# Patient Record
Sex: FEMALE | Race: WHITE | NOT HISPANIC OR LATINO | Employment: PART TIME | URBAN - METROPOLITAN AREA
[De-identification: names, ages, dates, MRNs, and addresses within clinical notes are randomized per-mention and may not be internally consistent; named-entity substitution may affect disease eponyms.]

---

## 2023-02-22 LAB
ESTRADIOL (PG/ML) IN SER/PLAS: 26 PG/ML
FOLLITROPIN (IU/L) IN SER/PLAS: 91.8 IU/L

## 2023-03-31 ENCOUNTER — TELEPHONE (OUTPATIENT)
Dept: PRIMARY CARE | Facility: CLINIC | Age: 53
End: 2023-03-31
Payer: COMMERCIAL

## 2023-04-06 ENCOUNTER — TELEMEDICINE (OUTPATIENT)
Dept: PRIMARY CARE | Facility: CLINIC | Age: 53
End: 2023-04-06
Payer: COMMERCIAL

## 2023-04-06 DIAGNOSIS — G56.03 CARPAL TUNNEL SYNDROME ON BOTH SIDES: Primary | ICD-10-CM

## 2023-04-06 DIAGNOSIS — Z00.00 ROUTINE GENERAL MEDICAL EXAMINATION AT A HEALTH CARE FACILITY: ICD-10-CM

## 2023-04-06 DIAGNOSIS — R79.89 LOW VITAMIN D LEVEL: ICD-10-CM

## 2023-04-06 DIAGNOSIS — B07.0 PLANTAR WART: ICD-10-CM

## 2023-04-06 PROCEDURE — 99214 OFFICE O/P EST MOD 30 MIN: CPT | Performed by: FAMILY MEDICINE

## 2023-04-06 NOTE — PATIENT INSTRUCTIONS
Please try the Compound W Freeze Off    Please follow up in 3 months     Get me the paperwork for Marlena's guardianship     Get updated fasting blood work just prior to your next visit.

## 2023-04-06 NOTE — PROGRESS NOTES
"Subjective   Duran Cho is a 53 y.o. female who presents for No chief complaint on file..    HPI  Duran is a pleasant 53 yr old female here for follow up on anxiety  Is working on getting guardianship of her disabled daughter  Bilateral knee replacement, 2019 (jan and Nov)  had IUD- this winter is 5 years--- will need referral for removal- next year with PA will have it removed.   2019 wt was 235--> 155 down 80 pounds, with starting medical THC and knee replacement   Off all RX meds     #) Stress, brief reaction - better   - a close friend, Tomi ( son of a good friend was raised with her children) suicide in april -2022, was only 21 yrs old   - was her daughters good friend and mom is very closed to his mother   - had a lot of death and grief in her life, just not this close to home   - taking her for a loop  - division of this world with political climate is also making things worse  - mother is very much a \"Trumper\" and pt is not, so lots of conflict in her family  - on effexor , not been on anything else, been on this for 20 years.   - cant sleep anymore, very anxious   - fatigue and exhausted, reading and journaling   - on sunday \"fell off a sanam\" - reached out for help    #) left thumb concerns- stable   - left ventral radial wrist ganglion cyst   - swells and goes numb     #) generalized body aches and joint pain- stable . much better with 100 pound weight loss   - she has medical THC card and it really helps   - neck , shoulder and thoracic spine tension   - bad reaction to GABAPENTIN AND LYRICA   - had both knees replaced in 2019   - left foot surgery for tibial tendon    #) Wayne with GERD - stable  - stopped her PPI   - did get some teeth removed and getting partial soon   - EGD and Cscope on 5/19/21- repeat in 5 years   - pt reports improved with THC edibles     #) tobacco use  - 1/2 ppd     #) Preventive:  Etoh: occasional   BMI: 37  BP: 119/82  Fam h/o:  Vaccinations:  PAP: needs  Mammogram: " needs, order given   Colonoscopy: n/a til 50  DEXA: n/a  ASA 81: no  Low Dose CT: n/a til 56  HepC screen: n/a  HIV screen:  Difficulty staying asleep and sleeps 5 hours at a time  Fasting blood work: now     ROS was completed and all systems are negative with the exception of what was noted in the the HPI.     Objective     There were no vitals taken for this visit.     Physical Exam  Deferred due to virtual     Assessment/Plan   Problem List Items Addressed This Visit    None  Great job with the weight loss     continue to get the partials for the teeth and chewing     Glad you were successful with coming off the effexor and lexapro     Please follow up with Dr Mazariegos about the Mirena/ IUD, or a new referral was placed     for the night sweats consider black cohosh with tumeric.     Glad to see your EGD and Colonoscopy was good in 5/19/2021. Repeat in 2026    Labs in July 2022 were good.     for the neck and back pain, get a foam roller     Mammogram on 12/29/21, you are due for repeat in December 2022.     Your blood pressure looks good.     150 mins of aerobic exercise is advised for good cardiovascular health and maintain a healthy weight.     Please try to work on maintaining a healthy body weight, with a BMI close to or at a BMI 19-25. Your BMI is 26.    Recommend a whole-foods, plant-based diet, filled with fresh fruits, vegetables, seeds, beans, nuts and berries.    OF course, do not text and drive and always wear a seat belt.     Please follow up in 3 months or sooner as needed.        Cydney Toure DO, MSMed, ABOM  [address]   [id number]   [place] [address]  Ph. [phone]  Fx. [phone]

## 2023-06-13 DIAGNOSIS — R59.0 LYMPHADENOPATHY, POSTERIOR CERVICAL: Primary | ICD-10-CM

## 2023-06-13 NOTE — PROGRESS NOTES
Weight loss  Night sweats  Left posterior LAD   - did tweak the neck a few times in the last couple of weeks  No ear infection, no sinus infection   Did color her hair, no no scalp trauma or rashes   Is a smoker   Will order an US of the neck and refer to ENT

## 2023-06-22 ENCOUNTER — LAB (OUTPATIENT)
Dept: LAB | Facility: LAB | Age: 53
End: 2023-06-22
Payer: COMMERCIAL

## 2023-06-22 DIAGNOSIS — Z00.00 ROUTINE GENERAL MEDICAL EXAMINATION AT A HEALTH CARE FACILITY: ICD-10-CM

## 2023-06-22 DIAGNOSIS — R79.89 LOW VITAMIN D LEVEL: ICD-10-CM

## 2023-06-22 LAB
ALANINE AMINOTRANSFERASE (SGPT) (U/L) IN SER/PLAS: 13 U/L (ref 7–45)
ALBUMIN (G/DL) IN SER/PLAS: 4 G/DL (ref 3.4–5)
ALKALINE PHOSPHATASE (U/L) IN SER/PLAS: 79 U/L (ref 33–110)
ANION GAP IN SER/PLAS: 13 MMOL/L (ref 10–20)
ASPARTATE AMINOTRANSFERASE (SGOT) (U/L) IN SER/PLAS: 16 U/L (ref 9–39)
BASOPHILS (10*3/UL) IN BLOOD BY AUTOMATED COUNT: 0.06 X10E9/L (ref 0–0.1)
BASOPHILS/100 LEUKOCYTES IN BLOOD BY AUTOMATED COUNT: 0.6 % (ref 0–2)
BILIRUBIN TOTAL (MG/DL) IN SER/PLAS: 0.4 MG/DL (ref 0–1.2)
CALCIDIOL (25 OH VITAMIN D3) (NG/ML) IN SER/PLAS: 83 NG/ML
CALCIUM (MG/DL) IN SER/PLAS: 9.5 MG/DL (ref 8.6–10.3)
CARBON DIOXIDE, TOTAL (MMOL/L) IN SER/PLAS: 26 MMOL/L (ref 21–32)
CHLORIDE (MMOL/L) IN SER/PLAS: 106 MMOL/L (ref 98–107)
CHOLESTEROL (MG/DL) IN SER/PLAS: 136 MG/DL (ref 0–199)
CHOLESTEROL IN HDL (MG/DL) IN SER/PLAS: 47.6 MG/DL
CHOLESTEROL/HDL RATIO: 2.9
CREATININE (MG/DL) IN SER/PLAS: 0.67 MG/DL (ref 0.5–1.05)
EOSINOPHILS (10*3/UL) IN BLOOD BY AUTOMATED COUNT: 0.24 X10E9/L (ref 0–0.7)
EOSINOPHILS/100 LEUKOCYTES IN BLOOD BY AUTOMATED COUNT: 2.2 % (ref 0–6)
ERYTHROCYTE DISTRIBUTION WIDTH (RATIO) BY AUTOMATED COUNT: 13 % (ref 11.5–14.5)
ERYTHROCYTE MEAN CORPUSCULAR HEMOGLOBIN CONCENTRATION (G/DL) BY AUTOMATED: 32.9 G/DL (ref 32–36)
ERYTHROCYTE MEAN CORPUSCULAR VOLUME (FL) BY AUTOMATED COUNT: 94 FL (ref 80–100)
ERYTHROCYTES (10*6/UL) IN BLOOD BY AUTOMATED COUNT: 4.48 X10E12/L (ref 4–5.2)
GFR FEMALE: >90 ML/MIN/1.73M2
GLUCOSE (MG/DL) IN SER/PLAS: 73 MG/DL (ref 74–99)
HEMATOCRIT (%) IN BLOOD BY AUTOMATED COUNT: 42.3 % (ref 36–46)
HEMOGLOBIN (G/DL) IN BLOOD: 13.9 G/DL (ref 12–16)
IMMATURE GRANULOCYTES/100 LEUKOCYTES IN BLOOD BY AUTOMATED COUNT: 0.3 % (ref 0–0.9)
LDL: 78 MG/DL (ref 0–99)
LEUKOCYTES (10*3/UL) IN BLOOD BY AUTOMATED COUNT: 10.7 X10E9/L (ref 4.4–11.3)
LYMPHOCYTES (10*3/UL) IN BLOOD BY AUTOMATED COUNT: 3.38 X10E9/L (ref 1.2–4.8)
LYMPHOCYTES/100 LEUKOCYTES IN BLOOD BY AUTOMATED COUNT: 31.6 % (ref 13–44)
MONOCYTES (10*3/UL) IN BLOOD BY AUTOMATED COUNT: 0.99 X10E9/L (ref 0.1–1)
MONOCYTES/100 LEUKOCYTES IN BLOOD BY AUTOMATED COUNT: 9.3 % (ref 2–10)
NEUTROPHILS (10*3/UL) IN BLOOD BY AUTOMATED COUNT: 5.98 X10E9/L (ref 1.2–7.7)
NEUTROPHILS/100 LEUKOCYTES IN BLOOD BY AUTOMATED COUNT: 56 % (ref 40–80)
PLATELETS (10*3/UL) IN BLOOD AUTOMATED COUNT: 223 X10E9/L (ref 150–450)
POTASSIUM (MMOL/L) IN SER/PLAS: 4 MMOL/L (ref 3.5–5.3)
PROTEIN TOTAL: 6.1 G/DL (ref 6.4–8.2)
SODIUM (MMOL/L) IN SER/PLAS: 141 MMOL/L (ref 136–145)
THYROTROPIN (MIU/L) IN SER/PLAS BY DETECTION LIMIT <= 0.05 MIU/L: 3.92 MIU/L (ref 0.44–3.98)
TRIGLYCERIDE (MG/DL) IN SER/PLAS: 54 MG/DL (ref 0–149)
UREA NITROGEN (MG/DL) IN SER/PLAS: 11 MG/DL (ref 6–23)
VLDL: 11 MG/DL (ref 0–40)

## 2023-06-22 PROCEDURE — 36415 COLL VENOUS BLD VENIPUNCTURE: CPT

## 2023-06-22 PROCEDURE — 85025 COMPLETE CBC W/AUTO DIFF WBC: CPT

## 2023-06-22 PROCEDURE — 84443 ASSAY THYROID STIM HORMONE: CPT

## 2023-06-22 PROCEDURE — 80061 LIPID PANEL: CPT

## 2023-06-22 PROCEDURE — 80053 COMPREHEN METABOLIC PANEL: CPT

## 2023-06-22 PROCEDURE — 82306 VITAMIN D 25 HYDROXY: CPT

## 2023-06-26 ENCOUNTER — LAB (OUTPATIENT)
Dept: LAB | Facility: LAB | Age: 53
End: 2023-06-26
Payer: COMMERCIAL

## 2023-06-26 ENCOUNTER — OFFICE VISIT (OUTPATIENT)
Dept: PRIMARY CARE | Facility: CLINIC | Age: 53
End: 2023-06-26
Payer: COMMERCIAL

## 2023-06-26 VITALS
WEIGHT: 148 LBS | HEART RATE: 91 BPM | BODY MASS INDEX: 23.89 KG/M2 | SYSTOLIC BLOOD PRESSURE: 132 MMHG | OXYGEN SATURATION: 98 % | DIASTOLIC BLOOD PRESSURE: 74 MMHG

## 2023-06-26 DIAGNOSIS — R63.4 UNEXPLAINED WEIGHT LOSS: ICD-10-CM

## 2023-06-26 DIAGNOSIS — E16.2 HYPOGLYCEMIA: ICD-10-CM

## 2023-06-26 DIAGNOSIS — F17.200 SMOKER: ICD-10-CM

## 2023-06-26 DIAGNOSIS — Z00.00 PREVENTATIVE HEALTH CARE: ICD-10-CM

## 2023-06-26 DIAGNOSIS — R63.4 UNEXPLAINED WEIGHT LOSS: Primary | ICD-10-CM

## 2023-06-26 LAB
C REACTIVE PROTEIN (MG/L) IN SER/PLAS: 0.17 MG/DL
SEDIMENTATION RATE, ERYTHROCYTE: 10 MM/H (ref 0–30)

## 2023-06-26 PROCEDURE — 99396 PREV VISIT EST AGE 40-64: CPT | Performed by: FAMILY MEDICINE

## 2023-06-26 PROCEDURE — 87389 HIV-1 AG W/HIV-1&-2 AB AG IA: CPT

## 2023-06-26 PROCEDURE — 83525 ASSAY OF INSULIN: CPT

## 2023-06-26 PROCEDURE — 84305 ASSAY OF SOMATOMEDIN: CPT

## 2023-06-26 PROCEDURE — 83970 ASSAY OF PARATHORMONE: CPT

## 2023-06-26 PROCEDURE — 36415 COLL VENOUS BLD VENIPUNCTURE: CPT

## 2023-06-26 PROCEDURE — 86140 C-REACTIVE PROTEIN: CPT

## 2023-06-26 PROCEDURE — 85652 RBC SED RATE AUTOMATED: CPT

## 2023-06-26 PROCEDURE — 99214 OFFICE O/P EST MOD 30 MIN: CPT | Performed by: FAMILY MEDICINE

## 2023-06-26 PROCEDURE — 86803 HEPATITIS C AB TEST: CPT

## 2023-06-26 PROCEDURE — 81003 URINALYSIS AUTO W/O SCOPE: CPT

## 2023-06-26 PROCEDURE — 1036F TOBACCO NON-USER: CPT | Performed by: FAMILY MEDICINE

## 2023-06-26 ASSESSMENT — PATIENT HEALTH QUESTIONNAIRE - PHQ9
1. LITTLE INTEREST OR PLEASURE IN DOING THINGS: SEVERAL DAYS
2. FEELING DOWN, DEPRESSED OR HOPELESS: SEVERAL DAYS
SUM OF ALL RESPONSES TO PHQ9 QUESTIONS 1 AND 2: 2

## 2023-06-26 NOTE — PATIENT INSTRUCTIONS
Normal thyroid levels.   Normal electrolytes, other than the low sugar.   Also no explanation of the low protein levels.   continue to get the partials for the teeth and chewing     Lets try to get CT of the abdomen and pelvis.   Will get a chest xray first.     Please follow up with Dr Mazariegos about the Mirena/ IUD removal.    Glad to see your EGD and Colonoscopy was good in 5/19/2021. Repeat in 2026    Mammogram on 12/29/21, you are due for repeat in December 2022.     Your blood pressure looks good.     Please follow up in 3 months

## 2023-06-26 NOTE — PROGRESS NOTES
"Subjective   Patient is a 53 y.o. female presents for yearly physical examination.     Is working on getting guardianship of her disabled daughter    Bilateral knee replacement, 2019 (jan and Nov)    had IUD- this winter is 5 years--- will need referral for removal- next year with PA will have it removed.     2019 wt was 235--> 155 down 80 pounds--> 148 , with starting medical THC and knee replacement     #) left suboccipital LAD  - had US and looked reactive on US   - do appear slightly smaller     #) feels like she needs to eat a lot to keep her energy levels up   - drinking 3-4 Ensure per day, 2 large meals, lexi bars and Gatorade/Tea/Coffee with Oatmilk, and snacking  - not a lot of options right now due to low funds     #) is getting frequent hypoglycemia   - still getting night sweats and hot flashes   - feeling weak and fatigued  - can't keep weight above 150  - CT brain looked good in 2016.   - is a smoker, would like to image the lungs   - EGD and Cscope on 5/19/21- repeat in 5 years     #) Stress, brief reaction - better   - mother is very much a \"Trumper\" and pt is not, so lots of conflict in her family  - on effexor , not been on anything else, been on this for 20 years.   - fatigue and exhausted, reading and journaling   - on sunday \"fell off a sanam\" - reached out for help    #) left thumb concerns- stable   - left ventral radial wrist ganglion cyst   - swells and goes numb     #) generalized body aches and joint pain- stable . much better with 100 pound weight loss , but still having it   - she has medical THC card and it really helps   - neck , shoulder and thoracic spine tension   - bad reaction to GABAPENTIN AND LYRICA   - had both knees replaced in 2019   - left foot surgery for tibial tendon    #) Wayne with GERD - stable  - stopped her PPI   - did get some teeth removed and getting partial soon   - EGD and Cscope on 5/19/21- repeat in 5 years   - pt reports improved with THC edibles     #) " tobacco use- for years.   - 1/2 ppd     #) Preventive:  Etoh: occasional   BMI: 37  BP: 119/82  Fam h/o:  Vaccinations:  PAP: needs  Mammogram: needs, order given   Colonoscopy: n/a til 50  DEXA: n/a  ASA 81: no  Low Dose CT: n/a til 55  HepC screen: n/a  HIV screen:  Difficulty staying asleep and sleeps 5 hours at a time  Fasting blood work: now     Review of system are negative unless otherwise stated in the HPI.     Objective     /74   Pulse 91   Wt 67.1 kg (148 lb)   SpO2 98%   BMI 23.89 kg/m²     Physical Examination  GEN: A+O, no acute distress  HEENT: NC/AT, Oropharynx clear, no exudates, TM visualized, Extraoccular muscles intact, no facial droop; no thyromegaly or cervical LAD  RESP: CTAB, no wheezes   CV: RRR, no murmurs  ABD: soft, non-tender, + BS  SKIN: no rashes or bruising, no peripheral edema   NEURO: CN II-XII grossly intact, moves all extremities equally, no tremor   PSYCH: normal affect, appropriate mood     Assessment/Plan     We reviewed the most resent labs  Normal thyroid levels.   Normal electrolytes, other than the low sugar.   Also no explanation of the low protein levels.   continue to get the partials for the teeth and chewing     Lets try to get CT of the abdomen and pelvis.   Will get a chest xray first.     Please follow up with Dr Mazariegos about the Mirena/ IUD removal.    Glad to see your EGD and Colonoscopy was good in 5/19/2021. Repeat in 2026    Mammogram on 12/29/21, you are due for repeat in December 2022.     Your blood pressure looks good.     Please follow up in 3 months

## 2023-06-27 LAB
APPEARANCE, URINE: CLEAR
BILIRUBIN, URINE: NEGATIVE
BLOOD, URINE: NEGATIVE
COLOR, URINE: YELLOW
GLUCOSE, URINE: NEGATIVE MG/DL
HEPATITIS C VIRUS AB PRESENCE IN SERUM: NONREACTIVE
HIV 1/ 2 AG/AB SCREEN: NONREACTIVE
INSULIN: 35 UIU/ML (ref 3–25)
KETONES, URINE: NEGATIVE MG/DL
LEUKOCYTE ESTERASE, URINE: NEGATIVE
NITRITE, URINE: NEGATIVE
PARATHYRIN INTACT (PG/ML) IN SER/PLAS: 41.6 PG/ML (ref 18.5–88)
PH, URINE: 7 (ref 5–8)
PROTEIN, URINE: NEGATIVE MG/DL
SPECIFIC GRAVITY, URINE: 1 (ref 1–1.03)
UROBILINOGEN, URINE: <2 MG/DL (ref 0–1.9)

## 2023-06-30 LAB
IGF 1 (INSULIN-LIKE GROWTH FACTOR 1): 227 NG/ML (ref 53–234)
IGF 1 Z SCORE CALCULATION: 1.7

## 2023-07-16 DIAGNOSIS — E27.8 ADRENAL HYPERPLASIA (MULTI): Primary | ICD-10-CM

## 2023-07-16 DIAGNOSIS — E16.1 HYPERINSULINEMIA: ICD-10-CM

## 2023-07-17 ENCOUNTER — LAB (OUTPATIENT)
Dept: LAB | Facility: LAB | Age: 53
End: 2023-07-17
Payer: COMMERCIAL

## 2023-07-17 DIAGNOSIS — E16.1 HYPERINSULINEMIA: ICD-10-CM

## 2023-07-17 DIAGNOSIS — E27.8 ADRENAL HYPERPLASIA (MULTI): ICD-10-CM

## 2023-07-17 PROCEDURE — 84146 ASSAY OF PROLACTIN: CPT

## 2023-07-17 PROCEDURE — 82533 TOTAL CORTISOL: CPT

## 2023-07-17 PROCEDURE — 36415 COLL VENOUS BLD VENIPUNCTURE: CPT

## 2023-07-17 PROCEDURE — 83036 HEMOGLOBIN GLYCOSYLATED A1C: CPT

## 2023-07-17 PROCEDURE — 84681 ASSAY OF C-PEPTIDE: CPT

## 2023-07-17 PROCEDURE — 82024 ASSAY OF ACTH: CPT

## 2023-07-18 LAB
C PEPTIDE (NG/ML) IN SER/PLAS: 2 NG/ML (ref 0.7–3.9)
CORTISOL (UG/DL) IN SERUM: 24.4 UG/DL (ref 2.5–20)
ESTIMATED AVERAGE GLUCOSE FOR HBA1C: 94 MG/DL
HEMOGLOBIN A1C/HEMOGLOBIN TOTAL IN BLOOD: 4.9 %
PROLACTIN (UG/L) IN SER/PLAS: 12.2 UG/L (ref 3–20)

## 2023-07-20 LAB — ADRENOCORTICOTROPIC HORMONE: 34.3 PG/ML (ref 7.2–63.3)

## 2023-08-11 ENCOUNTER — PATIENT MESSAGE (OUTPATIENT)
Dept: PRIMARY CARE | Facility: CLINIC | Age: 53
End: 2023-08-11
Payer: COMMERCIAL

## 2023-08-11 DIAGNOSIS — R63.4 UNEXPLAINED WEIGHT LOSS: Primary | ICD-10-CM

## 2023-08-17 ENCOUNTER — OFFICE VISIT (OUTPATIENT)
Dept: PRIMARY CARE | Facility: CLINIC | Age: 53
End: 2023-08-17
Payer: COMMERCIAL

## 2023-08-17 VITALS
DIASTOLIC BLOOD PRESSURE: 72 MMHG | OXYGEN SATURATION: 100 % | BODY MASS INDEX: 24.53 KG/M2 | WEIGHT: 152 LBS | HEART RATE: 75 BPM | SYSTOLIC BLOOD PRESSURE: 116 MMHG

## 2023-08-17 DIAGNOSIS — F51.02 ADJUSTMENT INSOMNIA: ICD-10-CM

## 2023-08-17 DIAGNOSIS — F43.21 GRIEF REACTION: Primary | ICD-10-CM

## 2023-08-17 PROBLEM — E55.9 VITAMIN D DEFICIENCY: Status: ACTIVE | Noted: 2023-08-17

## 2023-08-17 PROBLEM — J30.9 ALLERGIC RHINITIS: Status: ACTIVE | Noted: 2023-08-17

## 2023-08-17 PROBLEM — M25.50 POLYARTHRALGIA: Status: ACTIVE | Noted: 2023-08-17

## 2023-08-17 PROBLEM — K22.70 BARRETT'S ESOPHAGUS WITHOUT DYSPLASIA: Status: ACTIVE | Noted: 2017-02-07

## 2023-08-17 PROBLEM — F41.8 DEPRESSION WITH ANXIETY: Status: ACTIVE | Noted: 2017-01-30

## 2023-08-17 PROBLEM — Z96.652 STATUS POST TOTAL LEFT KNEE REPLACEMENT: Status: ACTIVE | Noted: 2023-08-17

## 2023-08-17 PROBLEM — K90.9 ABNORMAL INTESTINAL ABSORPTION (HHS-HCC): Status: ACTIVE | Noted: 2023-08-17

## 2023-08-17 PROBLEM — D64.9 ABSOLUTE ANEMIA: Status: ACTIVE | Noted: 2023-08-17

## 2023-08-17 PROBLEM — E16.1 REACTIVE HYPOGLYCEMIA: Status: ACTIVE | Noted: 2023-08-17

## 2023-08-17 PROBLEM — K21.9 GERD WITHOUT ESOPHAGITIS: Status: ACTIVE | Noted: 2023-08-17

## 2023-08-17 PROCEDURE — 99214 OFFICE O/P EST MOD 30 MIN: CPT | Performed by: FAMILY MEDICINE

## 2023-08-17 PROCEDURE — 1036F TOBACCO NON-USER: CPT | Performed by: FAMILY MEDICINE

## 2023-08-17 RX ORDER — TRAZODONE HYDROCHLORIDE 100 MG/1
100 TABLET ORAL NIGHTLY PRN
Qty: 30 TABLET | Refills: 0 | Status: SHIPPED | OUTPATIENT
Start: 2023-08-17 | End: 2023-09-01 | Stop reason: SDUPTHER

## 2023-08-17 ASSESSMENT — PATIENT HEALTH QUESTIONNAIRE - PHQ9
1. LITTLE INTEREST OR PLEASURE IN DOING THINGS: NEARLY EVERY DAY
SUM OF ALL RESPONSES TO PHQ9 QUESTIONS 1 AND 2: 6
2. FEELING DOWN, DEPRESSED OR HOPELESS: NEARLY EVERY DAY

## 2023-08-17 NOTE — PATIENT INSTRUCTIONS
"Please look into getting the book \"Its OK that you're not OK\" by Nona Torres     Please try the trazodone 1 hour prior to bedtime for treatment of insomnia    Follow up in 1 month or sooner as needed.   "

## 2023-08-17 NOTE — PROGRESS NOTES
"Subjective   Patient is a 53 y.o. female presents for follow up on the loss of her daughter , joe    #) acute grief reaction   - not sleep fell   - she found her passed in her bed one morning and performed 20 mins or more of CPR   - tried ativan and it helped a little   - mother is very much a \"Trumper\" and pt is not, so lots of conflict in her family on politics   - on effexor , not been on anything else, been on this for 20 years. Weaned off last year   - fatigue and exhausted, reading and journaling     Bilateral knee replacement, 2019 (jan and Nov)    had IUD- this winter is 5 years--- will need referral for removal- next year with PA will have it removed.     2019 wt was 235--> 155 down 80 pounds--> 148 , with starting medical THC and knee replacement     #) left suboccipital LAD  - had US and looked reactive on US   - do appear slightly smaller     #) feels like she needs to eat a lot to keep her energy levels up   - drinking 3-4 Ensure per day, 2 large meals, lexi bars and Gatorade/Tea/Coffee with Oatmilk, and snacking  - not a lot of options right now due to low funds     #) is getting frequent hypoglycemia   - still getting night sweats and hot flashes   - feeling weak and fatigued  - can't keep weight above 150  - CT brain looked good in 2016.   - is a smoker, would like to image the lungs   - EGD and Cscope on 5/19/21- repeat in 5 years     #) left thumb concerns- stable   - left ventral radial wrist ganglion cyst   - swells and goes numb     #) generalized body aches and joint pain- stable . much better with 100 pound weight loss , but still having it   - she has medical THC card and it really helps   - neck , shoulder and thoracic spine tension   - bad reaction to GABAPENTIN AND LYRICA   - had both knees replaced in 2019   - left foot surgery for tibial tendon    #) Wayne with GERD - stable  - stopped her PPI   - did get some teeth removed and getting partial soon   - EGD and Cscope on 5/19/21- " "repeat in 5 years   - pt reports improved with THC edibles     #) tobacco use- for years.   - 1/2 ppd     #) Preventive:  Etoh: occasional   BMI: 37  BP: 119/82  Fam h/o:  Vaccinations:  PAP: needs  Mammogram: needs, order given   Colonoscopy: n/a til 50  DEXA: n/a  ASA 81: no  Low Dose CT: n/a til 55  HepC screen: n/a  HIV screen:  Difficulty staying asleep and sleeps 5 hours at a time  Fasting blood work: now     Review of system are negative unless otherwise stated in the HPI.     Objective     /72   Pulse 75   Wt 68.9 kg (152 lb)   SpO2 100%   BMI 24.53 kg/m²     Physical Examination  GEN: A+O, no acute distress  HEENT: NC/AT, Oropharynx clear, no exudates, TM visualized, Extraoccular muscles intact, no facial droop; no thyromegaly or cervical LAD  RESP: CTAB, no wheezes   CV: RRR, no murmurs  ABD: soft, non-tender, + BS  SKIN: no rashes or bruising, no peripheral edema   NEURO: CN II-XII grossly intact, moves all extremities equally, no tremor   PSYCH: normal affect, appropriate mood     Assessment/Plan     Please look into getting the book \"Its OK that you're not OK\" by Nona Torres     Please try the trazodone 1 hour prior to bedtime for treatment of insomnia    Follow up in 1 month or sooner as needed.   "

## 2023-09-01 DIAGNOSIS — F51.02 ADJUSTMENT INSOMNIA: ICD-10-CM

## 2023-09-01 RX ORDER — TRAZODONE HYDROCHLORIDE 100 MG/1
150 TABLET ORAL NIGHTLY
Qty: 135 TABLET | Refills: 3 | Status: SHIPPED | OUTPATIENT
Start: 2023-09-01 | End: 2023-12-21 | Stop reason: DRUGHIGH

## 2023-09-19 ENCOUNTER — APPOINTMENT (OUTPATIENT)
Dept: PRIMARY CARE | Facility: CLINIC | Age: 53
End: 2023-09-19
Payer: COMMERCIAL

## 2023-09-28 ENCOUNTER — OFFICE VISIT (OUTPATIENT)
Dept: PRIMARY CARE | Facility: CLINIC | Age: 53
End: 2023-09-28
Payer: COMMERCIAL

## 2023-09-28 VITALS — DIASTOLIC BLOOD PRESSURE: 74 MMHG | SYSTOLIC BLOOD PRESSURE: 112 MMHG | BODY MASS INDEX: 24.02 KG/M2 | WEIGHT: 148.8 LBS

## 2023-09-28 DIAGNOSIS — F43.21 ADJUSTMENT REACTION, DEPRESSIVE, BRIEF: ICD-10-CM

## 2023-09-28 DIAGNOSIS — M67.432 GANGLION CYST OF VOLAR ASPECT OF LEFT WRIST: Primary | ICD-10-CM

## 2023-09-28 DIAGNOSIS — J30.1 SEASONAL ALLERGIC RHINITIS DUE TO POLLEN: ICD-10-CM

## 2023-09-28 PROCEDURE — 99214 OFFICE O/P EST MOD 30 MIN: CPT | Performed by: FAMILY MEDICINE

## 2023-09-28 PROCEDURE — 1036F TOBACCO NON-USER: CPT | Performed by: FAMILY MEDICINE

## 2023-09-28 ASSESSMENT — PATIENT HEALTH QUESTIONNAIRE - PHQ9
SUM OF ALL RESPONSES TO PHQ9 QUESTIONS 1 AND 2: 2
1. LITTLE INTEREST OR PLEASURE IN DOING THINGS: SEVERAL DAYS
2. FEELING DOWN, DEPRESSED OR HOPELESS: SEVERAL DAYS

## 2023-09-28 NOTE — PATIENT INSTRUCTIONS
Please set up a consultation with Dr Tadeo for the ganglion cyst on the left wrist.     Please try the Flonase over the counter for the nasal congestion     Your blood pressure looks very good at 112/74     Drink more water, make sure the urine is clear.     Weight is stable.     Repeat mammogram in February 2024.     Please continue the trazodone 1 hour prior to bedtime for treatment of insomnia    Follow up in 3 months or sooner as needed.

## 2023-09-28 NOTE — PROGRESS NOTES
Subjective   Patient is a 53 y.o. female presents for follow up on the loss of her daughter , joe    #) acute grief reaction - coping as expected, still tearful and working through the grief   - sleeping is better with the trazodone   - she found her passed in her bed one morning and performed 20 mins or more of CPR - 5-6 weeks ago   - tried ativan and it helped a little   - on effexor , not been on anything else, been on this for 20 years. Weaned off last year   - fatigue and exhausted, reading and journaling     Bilateral knee replacement, 2019 (jan and Nov)    had IUD- this winter is 5 years--- will need referral for removal- next year with PA will have it removed.     2019 wt was 235--> 155 down 80 pounds--> 148 , with starting medical THC and knee replacement     #) left suboccipital LAD  - had US and looked reactive on US   - do appear slightly smaller     #) feels like she needs to eat a lot to keep her energy levels up   - drinking 3-4 Ensure per day, 2 large meals, lexi bars and Gatorade/Tea/Coffee with Oatmilk, and snacking  - not a lot of options right now due to low funds     #) is getting frequent hypoglycemia - better   - still getting night sweats and hot flashes   - feeling weak and fatigued  - can't keep weight above 150  - CT brain looked good in 2016.   - is a smoker, would like to image the lungs   - EGD and Cscope on 5/19/21- repeat in 5 years     #) left thumb concerns- stable   - left ventral radial wrist ganglion cyst   - swells and goes numb     #) generalized body aches and joint pain- stable . much better with 100 pound weight loss , but still having it   - she has medical THC card and it really helps   - neck , shoulder and thoracic spine tension   - bad reaction to GABAPENTIN AND LYRICA   - had both knees replaced in 2019   - left foot surgery for tibial tendon    #) Wayne with GERD - stable  - stopped her PPI   - did get some teeth removed and getting partial soon   - EGD and Cscope  on 5/19/21- repeat in 5 years   - pt reports improved with THC edibles     #) 7/22/23- walked on a rack and it smacked her in the forehead just above the right  inner eye   - immediate and bruising  - then 2 days later started getting right inner eye bruising and then spread to the inner aspect of the left eye.   - no imaging   - recent eye examination and vision is fine  - still intermittent headaches, 3 days per week, worse with sinuses are stuffy    #) tobacco use- for years.   - 1/2 ppd     #) Preventive:  Etoh: occasional   BMI: 37  BP: 119/82  Fam h/o:  Vaccinations:  PAP: needs  Mammogram: needs, order given   Colonoscopy: n/a til 50  DEXA: n/a  ASA 81: no  Low Dose CT: n/a til 55  HepC screen: n/a  HIV screen:  Difficulty staying asleep and sleeps 5 hours at a time  Fasting blood work: now     Review of system are negative unless otherwise stated in the HPI.     Objective     /74   Wt 67.5 kg (148 lb 12.8 oz)   BMI 24.02 kg/m²     Physical Examination  GEN: A+O, no acute distress  HEENT: NC/AT, Oropharynx clear, no exudates, TM visualized, Extraoccular muscles intact, no facial droop; no thyromegaly or cervical LAD  RESP: CTAB, no wheezes   CV: RRR, no murmurs  ABD: soft, non-tender, + BS  SKIN: no rashes or bruising, no peripheral edema   NEURO: CN II-XII grossly intact, moves all extremities equally, no tremor   PSYCH: normal affect, appropriate mood     Assessment/Plan     Please set up a consultation with Dr Tadeo for the ganglion cyst on the left wrist.     Please try the Flonase over the counter for the nasal congestion     Your blood pressure looks very good at 112/74     Drink more water, make sure the urine is clear.     Weight is stable.     Repeat mammogram in February 2024.     Please continue the trazodone 1 hour prior to bedtime for treatment of insomnia    Follow up in 3 months or sooner as needed.

## 2023-10-13 DIAGNOSIS — R22.32 MASS OF LEFT WRIST: ICD-10-CM

## 2023-10-13 PROBLEM — G90.9 AUTONOMIC DYSFUNCTION: Status: ACTIVE | Noted: 2023-10-13

## 2023-10-13 PROBLEM — R21 RASH AND OTHER NONSPECIFIC SKIN ERUPTION: Status: ACTIVE | Noted: 2018-01-19

## 2023-10-13 PROBLEM — G52.2 VAGAL NERVE SENSITIVITY: Status: ACTIVE | Noted: 2023-10-13

## 2023-10-13 PROBLEM — E04.2 MULTIPLE THYROID NODULES: Status: ACTIVE | Noted: 2023-10-13

## 2023-10-13 PROBLEM — N92.1 MENORRHAGIA WITH IRREGULAR CYCLE: Status: ACTIVE | Noted: 2023-10-13

## 2023-10-13 PROBLEM — E66.3 OVERWEIGHT WITH BODY MASS INDEX (BMI) OF 28 TO 28.9 IN ADULT: Status: ACTIVE | Noted: 2023-10-13

## 2023-10-13 PROBLEM — M79.673 FOOT PAIN: Status: ACTIVE | Noted: 2023-10-13

## 2023-10-13 PROBLEM — M25.561 BILATERAL KNEE PAIN: Status: ACTIVE | Noted: 2023-10-13

## 2023-10-13 PROBLEM — M25.569 JOINT PAIN, KNEE: Status: ACTIVE | Noted: 2023-10-13

## 2023-10-13 PROBLEM — D50.8 OTHER IRON DEFICIENCY ANEMIAS: Status: ACTIVE | Noted: 2023-10-13

## 2023-10-13 PROBLEM — L30.4 ERYTHEMA INTERTRIGO: Status: ACTIVE | Noted: 2018-01-19

## 2023-10-13 PROBLEM — M21.40 PES PLANUS: Status: ACTIVE | Noted: 2023-10-13

## 2023-10-13 PROBLEM — M67.40 GANGLION CYST: Status: ACTIVE | Noted: 2023-10-13

## 2023-10-13 PROBLEM — R52 DIFFUSE PAIN: Status: ACTIVE | Noted: 2023-10-13

## 2023-10-13 PROBLEM — I82.90 VENOUS THROMBOSIS: Status: ACTIVE | Noted: 2023-10-13

## 2023-10-13 PROBLEM — L30.9 DERMATITIS, UNSPECIFIED: Status: ACTIVE | Noted: 2018-01-19

## 2023-10-13 PROBLEM — M25.562 BILATERAL KNEE PAIN: Status: ACTIVE | Noted: 2023-10-13

## 2023-10-13 PROBLEM — R05.3 CHRONIC COUGH: Status: ACTIVE | Noted: 2023-10-13

## 2023-10-13 PROBLEM — E04.9 ENLARGED THYROID: Status: ACTIVE | Noted: 2023-10-13

## 2023-10-13 PROBLEM — J37.0 LARYNGITIS, CHRONIC: Status: ACTIVE | Noted: 2023-10-13

## 2023-10-13 PROBLEM — E66.9 OBESITY: Status: ACTIVE | Noted: 2023-10-13

## 2023-10-13 RX ORDER — FLUTICASONE PROPIONATE 50 MCG
1-2 SPRAY, SUSPENSION (ML) NASAL DAILY
COMMUNITY
Start: 2019-03-12 | End: 2023-12-21 | Stop reason: WASHOUT

## 2023-10-13 RX ORDER — CYCLOBENZAPRINE HCL 5 MG
1 TABLET ORAL EVERY 12 HOURS
COMMUNITY
Start: 2021-08-16 | End: 2023-12-21 | Stop reason: WASHOUT

## 2023-10-13 RX ORDER — VENLAFAXINE HYDROCHLORIDE 225 MG/1
1 TABLET, EXTENDED RELEASE ORAL DAILY
COMMUNITY
End: 2023-12-21 | Stop reason: WASHOUT

## 2023-10-13 RX ORDER — DIAZEPAM 10 MG/1
20 TABLET ORAL
COMMUNITY
Start: 2022-11-16 | End: 2023-12-21 | Stop reason: WASHOUT

## 2023-10-13 RX ORDER — HYDROCODONE BITARTRATE AND ACETAMINOPHEN 10; 325 MG/1; MG/1
1 TABLET ORAL EVERY 4 HOURS PRN
COMMUNITY
End: 2023-12-21 | Stop reason: WASHOUT

## 2023-10-13 RX ORDER — CONJUGATED ESTROGENS AND MEDROXYPROGESTERONE ACETATE 0.625 (14)
1 KIT ORAL DAILY
COMMUNITY
End: 2023-12-21 | Stop reason: WASHOUT

## 2023-10-13 RX ORDER — CHLORPHENIRAMINE MALEATE 4 MG
4 TABLET ORAL EVERY 6 HOURS PRN
COMMUNITY
End: 2023-12-21 | Stop reason: WASHOUT

## 2023-10-13 RX ORDER — BENZONATATE 200 MG/1
1 CAPSULE ORAL 2 TIMES DAILY
COMMUNITY
Start: 2021-08-16 | End: 2023-12-21 | Stop reason: WASHOUT

## 2023-10-13 RX ORDER — GUAIFENESIN AND PHENYLEPHRINE HCL 400; 10 MG/1; MG/1
1 TABLET ORAL
COMMUNITY
End: 2023-12-21 | Stop reason: WASHOUT

## 2023-10-13 RX ORDER — HYDROCODONE BITARTRATE AND ACETAMINOPHEN 5; 325 MG/1; MG/1
1 TABLET ORAL EVERY 4 HOURS PRN
COMMUNITY
Start: 2022-11-16 | End: 2023-12-21 | Stop reason: WASHOUT

## 2023-10-13 RX ORDER — DOXYCYCLINE 100 MG/1
100 CAPSULE ORAL 2 TIMES DAILY
COMMUNITY
End: 2023-12-21 | Stop reason: WASHOUT

## 2023-10-13 RX ORDER — ALBUTEROL SULFATE 90 UG/1
2 AEROSOL, METERED RESPIRATORY (INHALATION) EVERY 4 HOURS PRN
COMMUNITY
Start: 2021-03-12 | End: 2023-12-21 | Stop reason: WASHOUT

## 2023-10-13 RX ORDER — FERROUS SULFATE 325(65) MG
325 TABLET ORAL
COMMUNITY
Start: 2018-08-04 | End: 2023-12-21 | Stop reason: WASHOUT

## 2023-10-13 RX ORDER — GRISEOFULVIN 250 MG/1
500 TABLET ORAL
COMMUNITY
Start: 2018-01-19 | End: 2023-12-21 | Stop reason: WASHOUT

## 2023-10-13 RX ORDER — ESCITALOPRAM OXALATE 10 MG/1
1 TABLET ORAL DAILY
COMMUNITY
Start: 2022-09-16 | End: 2023-12-21 | Stop reason: WASHOUT

## 2023-10-13 RX ORDER — VENLAFAXINE HYDROCHLORIDE 75 MG/1
225 CAPSULE, EXTENDED RELEASE ORAL DAILY
COMMUNITY
End: 2023-12-21 | Stop reason: WASHOUT

## 2023-10-13 RX ORDER — PANTOPRAZOLE SODIUM 40 MG/1
40 TABLET, DELAYED RELEASE ORAL 2 TIMES DAILY
COMMUNITY
Start: 2018-05-23 | End: 2023-12-21 | Stop reason: WASHOUT

## 2023-10-13 RX ORDER — AMOXICILLIN AND CLAVULANATE POTASSIUM 875; 125 MG/1; MG/1
875 TABLET, FILM COATED ORAL 2 TIMES DAILY
COMMUNITY
End: 2023-12-21 | Stop reason: WASHOUT

## 2023-10-13 RX ORDER — ASPIRIN 325 MG
325 TABLET ORAL 2 TIMES DAILY
COMMUNITY
End: 2023-12-21 | Stop reason: WASHOUT

## 2023-10-13 RX ORDER — CLINDAMYCIN HYDROCHLORIDE 150 MG/1
CAPSULE ORAL
COMMUNITY
End: 2023-12-21 | Stop reason: WASHOUT

## 2023-10-13 RX ORDER — KETOCONAZOLE 20 MG/G
1 CREAM TOPICAL
COMMUNITY
Start: 2018-01-03 | End: 2023-12-21 | Stop reason: WASHOUT

## 2023-10-13 RX ORDER — MUPIROCIN 20 MG/G
1 OINTMENT TOPICAL
COMMUNITY
Start: 2018-01-09 | End: 2023-12-21 | Stop reason: WASHOUT

## 2023-10-16 ENCOUNTER — OFFICE VISIT (OUTPATIENT)
Dept: ORTHOPEDIC SURGERY | Facility: CLINIC | Age: 53
End: 2023-10-16
Payer: COMMERCIAL

## 2023-10-16 ENCOUNTER — HOSPITAL ENCOUNTER (OUTPATIENT)
Dept: RADIOLOGY | Facility: HOSPITAL | Age: 53
Discharge: HOME | End: 2023-10-16
Payer: COMMERCIAL

## 2023-10-16 ENCOUNTER — APPOINTMENT (OUTPATIENT)
Dept: RADIOLOGY | Facility: HOSPITAL | Age: 53
End: 2023-10-16
Payer: COMMERCIAL

## 2023-10-16 DIAGNOSIS — M67.432 GANGLION CYST OF VOLAR ASPECT OF LEFT WRIST: ICD-10-CM

## 2023-10-16 DIAGNOSIS — G56.02 CARPAL TUNNEL SYNDROME OF LEFT WRIST: Primary | ICD-10-CM

## 2023-10-16 DIAGNOSIS — R22.32 MASS OF LEFT WRIST: ICD-10-CM

## 2023-10-16 PROCEDURE — 99203 OFFICE O/P NEW LOW 30 MIN: CPT | Performed by: ORTHOPAEDIC SURGERY

## 2023-10-16 PROCEDURE — 73110 X-RAY EXAM OF WRIST: CPT | Mod: LT,FY

## 2023-10-16 PROCEDURE — 1036F TOBACCO NON-USER: CPT | Performed by: ORTHOPAEDIC SURGERY

## 2023-10-16 PROCEDURE — 73110 X-RAY EXAM OF WRIST: CPT | Mod: LEFT SIDE | Performed by: RADIOLOGY

## 2023-10-16 ASSESSMENT — PAIN - FUNCTIONAL ASSESSMENT: PAIN_FUNCTIONAL_ASSESSMENT: NO/DENIES PAIN

## 2023-10-17 NOTE — PROGRESS NOTES
History of Present Illness:  Chief Complaint   Patient presents with    Left Wrist - Follow-up     mass       Patient presents today for evaluation of left wrist mass that has been present for approximately 1 year.  It has slightly grown in size, but no associated pain.  She does also report intermittent numbness and tingling into her thumb and index finger.  Also with some soreness into her index finger DIP joint.  Soreness into the index finger is worse with direct pressure.  Numbness and tingling occur primarily overnight and in the morning as well as with increased hand activities.    Past Medical History:   Diagnosis Date    Cervicalgia 08/16/2021    Tenderness of neck    Personal history of other diseases of the female genital tract     History of abnormal uterine bleeding    Personal history of other diseases of the respiratory system 03/12/2021    History of reactive airway disease    Personal history of other diseases of the respiratory system 08/16/2021    History of acute sinusitis    Personal history of other medical treatment 08/12/2019    History of screening mammography    Postnasal drip 08/04/2021    Postnasal discharge    Unspecified osteoarthritis, unspecified site     Osteoarthritis (arthritis due to wear and tear of joints)       Medication Documentation Review Audit       Reviewed by Tala Carrasco CMA (Medical Assistant) on 10/16/23 at 1312      Medication Order Taking? Sig Documenting Provider Last Dose Status   albuterol 90 mcg/actuation inhaler [id number] No Inhale 2 puffs every 4 hours if needed for shortness of breath or wheezing. Historical Provider, MD Not Taking Active   amoxicillin-pot clavulanate (Augmentin) 875-125 mg tablet [id number] No Take 1 tablet (875 mg) by mouth 2 times a day. Historical Provider, MD Not Taking Active   aspirin (Anastasia Aspirin) 325 mg tablet [id number] No Take 1 tablet (325 mg) by mouth 2 times a day.  for post surgical to decrease risk Historical Provider, MD  Not Taking Active   benzonatate (Tessalon) 200 mg capsule [id number] No Take 1 capsule (200 mg) by mouth 2 times a day. Historical MD Lasha Not Taking Active   chlorpheniramine (Chlor-Trimeton) 4 mg tablet [id number] No Take 1 tablet (4 mg) by mouth every 6 hours if needed for allergies. Angel Colby MD Not Taking Active   clindamycin (Cleocin) 150 mg capsule [id number] No Take by mouth. 2 caps now, then 1 cap 4x daily Historical MD Lasha Not Taking Active   conjugated estrogens-medroxyPROGESTERone (Premphase) 0.625 mg (14)/ 0.625mg-5mg(14) tablet [id number] No Take 1 tablet by mouth once daily. for menstrual cramps Angel Colby MD Not Taking Active   cyclobenzaprine (Flexeril) 5 mg tablet [id number] No Take 1 tablet (5 mg) by mouth every 12 hours. Angel Colby MD Not Taking Active   diazePAM (Valium) 10 mg tablet [id number] No Take 2 tablets (20 mg) by mouth.  30 minutes before procedure Angel Colby MD Not Taking Active   diclofenac sodium 1 % kit [id number] No Apply topically 2 times a day as needed. to affected area Angel Colby MD Not Taking Active   doxycycline (Monodox) 100 mg capsule [id number] No Take 1 capsule (100 mg) by mouth 2 times a day. Angel Colby MD Not Taking Active   escitalopram (Lexapro) 10 mg tablet [id number] No Take 1 tablet (10 mg) by mouth once daily. Historical MD Lasha Not Taking Active   ferrous sulfate 325 (65 Fe) MG tablet [id number] No Take 1 tablet (325 mg) by mouth once daily. Historical MD Lasha Not Taking Active   fexofenadine HCl (ALLEGRA ORAL) [id number] No Take by mouth. Historical MD Lasha Not Taking Active   fluticasone (Flonase) 50 mcg/actuation nasal spray [id number] No Administer 1-2 sprays into each nostril once daily. Angel Colby MD Not Taking Active   griseofulvin (Grifulvin V) 500 mg tablet [id number] No Take 1 tablet (500 mg) by mouth. Historical MD Lasha Not Taking Active    HYDROcodone-acetaminophen (Norco)  mg tablet [id number] No Take 1 tablet by mouth every 4 hours if needed (pain). Angel Colby MD Not Taking Active   HYDROcodone-acetaminophen (Norco) 5-325 mg tablet [id number] No Take 1 tablet by mouth every 4 hours if needed for pain. Angel Colby MD Not Taking Active   ketoconazole (NIZOral) 2 % cream [id number] No Apply 1 Application topically. Angel Colby MD Not Taking Active   L. acidophilus/Bifid. animalis 15.5 billion cell capsule [id number] No Take 1 capsule by mouth once daily. Angel Colby MD Not Taking Active   Lactobacillus acidophilus 10 billion cell capsule [id number] No Take 1 capsule by mouth once daily. Angel Colby MD Not Taking Active   medical cannabis [id number] No CBD OIL Angel Colby MD Not Taking Active   mupirocin (Bactroban) 2 % ointment [id number] No Apply 1 Application topically. Historical MD Lasha Not Taking Active   pantoprazole (ProtoNix) 40 mg EC tablet [id number] No Take 1 tablet (40 mg) by mouth twice a day. 30 MINUTES BEFORE BREAKFAST AND DINNER Historical MD Lasha Not Taking Active   traZODone (Desyrel) 100 mg tablet [id number] Yes Take 1.5 tablets (150 mg) by mouth once daily at bedtime. Cydney Toure, DO Taking Active   turmeric root extract 500 mg capsule [id number] No Take 1 tablet by mouth once daily. Angel Colby MD Not Taking Active   venlafaxine 225 mg 24 hr tablet [id number] No Take 1 tablet (225 mg) by mouth once daily. Angel Colby MD Not Taking Active   venlafaxine XR (Effexor-XR) 75 mg 24 hr capsule [id number] No Take 3 capsules (225 mg) by mouth once daily. Historical Provider, MD Not Taking Active                    Allergies   Allergen Reactions    Latex Shortness of breath     shortness of breath and rash    Mushroom Combination No.1 Anaphylaxis and Hives    Carrot Hives and Swelling     swelling of mouth    Citalopram Unknown     Celexa, patient became very  sleepy       Social History     Socioeconomic History    Marital status:      Spouse name: Not on file    Number of children: Not on file    Years of education: Not on file    Highest education level: Not on file   Occupational History    Not on file   Tobacco Use    Smoking status: Former     Packs/day: .5     Types: Cigarettes    Smokeless tobacco: Never   Vaping Use    Vaping Use: Never used   Substance and Sexual Activity    Alcohol use: Never    Drug use: Never    Sexual activity: Not on file   Other Topics Concern    Not on file   Social History Narrative    Not on file     Social Determinants of Health     Financial Resource Strain: Not on file   Food Insecurity: Not on file   Transportation Needs: Not on file   Physical Activity: Not on file   Stress: Not on file   Social Connections: Not on file   Intimate Partner Violence: Not on file   Housing Stability: Not on file       Past Surgical History:   Procedure Laterality Date     SECTION, CLASSIC  2018     Section    OTHER SURGICAL HISTORY  2018    Foot Surgery Left    OTHER SURGICAL HISTORY  2018    Surgery Excision Lipoma    TONSILLECTOMY  2018    Tonsillectomy    TUBAL LIGATION  2018    Tubal Ligation        Review of Systems   GENERAL: Negative for malaise, significant weight loss, fever  MUSCULOSKELETAL: see HPI  NEURO:  Negative     Physical Examination  Constitutional: Appears well-developed and well-nourished.  Head: Normocephalic and atraumatic.  Eyes: EOMI grossly  Cardiovascular: Intact distal pulses.   Respiratory: Effort normal. No respiratory distress.  Neurologic: Alert and oriented to person, place, and time.  Skin: Skin is warm and dry.  Hematologic / Lymphatic: No lymphedema, lymphangitis.  Psychiatric: normal mood and affect. Behavior is normal.   Musculoskeletal:  Left hand/wrist: Skin intact.  Normal rugal patterns.  Palpable volar wrist ganglion measuring approximately 1 x 1 cm.   Nonpulsatile.  No tenderness.  60/60 degrees wrist flexion/extension.  Sensation grossly intact throughout.  Negative Tinel's at level of carpal tunnel.  Positive Vicky/Phalen's.  5/5 thumb abduction/finger abduction.  No thenar or intrinsic atrophy.    Radiographs: Left wrist radiographs ordered and available for my review/interpretation.  No fracture/dislocation.  Joint spaces maintained.     Assessment:  Patient with left carpal tunnel syndrome and left volar wrist ganglion     Plan:  Nature of the diagnoses were discussed with the patient.  Risks and benefits of treatment options for carpal tunnel were reviewed with the patient.  These include splinting, anti-inflammatories, corticosteroid injections, therapy and, if conservative options fail or if severity dictates, surgical release.  Patient prefers to continue with overnight bracing as needed.  If any worsening she will follow-up for further evaluation and consider additional treatment options.  Volar wrist ganglion is minimally symptomatic and she will plan on continued observation at this time.

## 2023-11-29 DIAGNOSIS — M25.511 ACUTE PAIN OF RIGHT SHOULDER: Primary | ICD-10-CM

## 2023-12-21 ENCOUNTER — OFFICE VISIT (OUTPATIENT)
Dept: PRIMARY CARE | Facility: CLINIC | Age: 53
End: 2023-12-21
Payer: COMMERCIAL

## 2023-12-21 VITALS
SYSTOLIC BLOOD PRESSURE: 120 MMHG | DIASTOLIC BLOOD PRESSURE: 78 MMHG | HEART RATE: 73 BPM | BODY MASS INDEX: 23.24 KG/M2 | WEIGHT: 144 LBS | OXYGEN SATURATION: 98 %

## 2023-12-21 DIAGNOSIS — F51.02 ADJUSTMENT INSOMNIA: ICD-10-CM

## 2023-12-21 DIAGNOSIS — Z12.39 SCREENING BREAST EXAMINATION: ICD-10-CM

## 2023-12-21 DIAGNOSIS — Z00.00 PREVENTATIVE HEALTH CARE: Primary | ICD-10-CM

## 2023-12-21 PROCEDURE — 1036F TOBACCO NON-USER: CPT | Performed by: FAMILY MEDICINE

## 2023-12-21 PROCEDURE — 99396 PREV VISIT EST AGE 40-64: CPT | Performed by: FAMILY MEDICINE

## 2023-12-21 PROCEDURE — 99213 OFFICE O/P EST LOW 20 MIN: CPT | Performed by: FAMILY MEDICINE

## 2023-12-21 RX ORDER — TRAZODONE HYDROCHLORIDE 150 MG/1
150 TABLET ORAL NIGHTLY PRN
Qty: 90 TABLET | Refills: 3 | Status: SHIPPED | OUTPATIENT
Start: 2023-12-21 | End: 2024-12-20

## 2023-12-21 NOTE — PROGRESS NOTES
Subjective   Patient is a 53 y.o. female presents for follow up     #) acute grief reaction - loss of daughter   - coping as expected,  - did start a grief group  - grief and trauma yoga on Thursday AM   - sleeping is better with the trazodone   - she found her passed in her bed one morning and performed 20 mins or more of CPR - 5-6 weeks ago - PTSD   - tried ativan and it helped a little- stopped it    - on effexor , not been on anything else, been on this for 20 years. Weaned off last year (2022)  - fatigue and exhausted, reading and journaling   - mother also needed cardioversion and might need a valve replaced.     #) Bilateral knee replacement, 2019 (jan and Nov)  - right shoulder pain, occ hurts  - yoga seems to help     #) had IUD- this winter is 5 years--- will need referral for removal- next year with PA will have it removed.     #) 2019 wt was 235--> 155 down 80 pounds--> 148 , with starting medical THC and knee replacement --> 144     #) feels like she needs to eat a lot to keep her energy levels up   - drinking 3-4 Ensure per day, 2 large meals, lexi bars and Gatorade/Tea/Coffee with Oatmilk, and snacking  - not a lot of options right now due to low funds     #) is getting frequent hypoglycemia - better   - still getting night sweats and hot flashes   - feeling weak and fatigued  - can't keep weight above 150  - CT brain looked good in 2016.   - is a smoker, would like to image the lungs   - EGD and Cscope on 5/19/21- repeat in 5 years     #) left thumb concerns- stable   - left ventral radial wrist ganglion cyst   - swells and goes numb     #) generalized body aches and joint pain  - stable . much better with 100 pound weight loss , but still having it   - she has medical THC card and it really helps   - neck , shoulder and thoracic spine tension   - bad reaction to GABAPENTIN AND LYRICA   - had both knees replaced in 2019   - left foot surgery for tibial tendon    #) Wayne with GERD - stable  -  stopped her PPI   - did get some teeth removed and getting partial soon   - EGD and Cscope on 5/19/21- repeat in 5 years   - pt reports improved with THC edibles     #) 7/22/23- walked on a rack and it smacked her in the forehead just above the right  inner eye   - immediate and bruising  - then 2 days later started getting right inner eye bruising and then spread to the inner aspect of the left eye.   - no imaging   - recent eye examination and vision is fine  - still intermittent headaches, 3 days per week, worse with sinuses are stuffy    #) tobacco use- for years.   - 1/2 ppd     #) Preventive:  Etoh: occasional   BMI: 37--> 23  BP: 120/78  Vaccinations: none  PAP: needs  Mammogram: 2/2/23  Colonoscopy: n/a til 51  DEXA: n/a  ASA 81: no  Low Dose CT: n/a til 56  HepC screen: n/a  HIV screen:  Difficulty staying asleep and sleeps 5 hours at a time  Fasting blood work: now     Review of system are negative unless otherwise stated in the HPI.     Objective     /78   Pulse 73   Wt 65.3 kg (144 lb)   SpO2 98%   BMI 23.24 kg/m²     Physical Examination  GEN: A+O, no acute distress  HEENT: NC/AT, Oropharynx clear, no exudates, TM visualized, Extraoccular muscles intact, no facial droop; no thyromegaly or cervical LAD  RESP: CTAB, no wheezes   CV: RRR, no murmurs  ABD: soft, non-tender, + BS  SKIN: no rashes or bruising, no peripheral edema   NEURO: CN II-XII grossly intact, moves all extremities equally, no tremor   PSYCH: normal affect, appropriate mood     Assessment/Plan     Follow up with Dr Tadeo as needed for the left wrist pain.       Your blood pressure looks very good at 120/78    Labs due in June 2024.     Due for repeat mammogram feb 2024.     Drink more water, make sure the urine is clear.     Refilled the trazodone 150 mg at night.     Repeat upper and lower scope in 2026.     Weight is stable.     Call if you need any medications.     Follow up in 4 months or sooner as needed.

## 2023-12-21 NOTE — PATIENT INSTRUCTIONS
Follow up with Dr Tadeo as needed for the left wrist pain.       Your blood pressure looks very good at 120/78    Labs due in June 2024.     Due for repeat mammogram feb 2024.     Drink more water, make sure the urine is clear.     Refilled the trazodone 150 mg at night.     Repeat upper and lower scope in 2026.     Weight is stable.     Call if you need any medications.     Follow up in 4 months or sooner as needed.

## 2024-02-02 ENCOUNTER — HOSPITAL ENCOUNTER (OUTPATIENT)
Dept: RADIOLOGY | Facility: HOSPITAL | Age: 54
Discharge: HOME | End: 2024-02-02
Payer: COMMERCIAL

## 2024-02-02 DIAGNOSIS — Z12.39 SCREENING BREAST EXAMINATION: ICD-10-CM

## 2024-02-02 PROCEDURE — 77067 SCR MAMMO BI INCL CAD: CPT | Performed by: RADIOLOGY

## 2024-02-02 PROCEDURE — 77063 BREAST TOMOSYNTHESIS BI: CPT | Performed by: RADIOLOGY

## 2024-02-02 PROCEDURE — 77067 SCR MAMMO BI INCL CAD: CPT

## 2024-02-27 ENCOUNTER — OFFICE VISIT (OUTPATIENT)
Dept: OBSTETRICS AND GYNECOLOGY | Facility: CLINIC | Age: 54
End: 2024-02-27
Payer: COMMERCIAL

## 2024-02-27 VITALS
SYSTOLIC BLOOD PRESSURE: 110 MMHG | DIASTOLIC BLOOD PRESSURE: 64 MMHG | BODY MASS INDEX: 22.5 KG/M2 | WEIGHT: 140 LBS | HEIGHT: 66 IN

## 2024-02-27 DIAGNOSIS — Z30.432 ENCOUNTER FOR IUD REMOVAL: ICD-10-CM

## 2024-02-27 DIAGNOSIS — Z01.419 WELL WOMAN EXAM WITH ROUTINE GYNECOLOGICAL EXAM: Primary | ICD-10-CM

## 2024-02-27 PROCEDURE — 58301 REMOVE INTRAUTERINE DEVICE: CPT | Performed by: NURSE PRACTITIONER

## 2024-02-27 PROCEDURE — 99396 PREV VISIT EST AGE 40-64: CPT | Performed by: NURSE PRACTITIONER

## 2024-02-27 ASSESSMENT — ENCOUNTER SYMPTOMS
NEUROLOGICAL NEGATIVE: 1
MUSCULOSKELETAL NEGATIVE: 1
HEMATOLOGIC/LYMPHATIC NEGATIVE: 1
RESPIRATORY NEGATIVE: 1
SINUS PAIN: 1
ALLERGIC/IMMUNOLOGIC NEGATIVE: 1
GASTROINTESTINAL NEGATIVE: 1
UNEXPECTED WEIGHT CHANGE: 1
CARDIOVASCULAR NEGATIVE: 1
EYES NEGATIVE: 1
SLEEP DISTURBANCE: 1

## 2024-02-27 NOTE — PROGRESS NOTES
"Patient ID: Duran Cho is a 54 y.o. female presents for IUD removal.    /64   Ht 1.676 m (5' 6\")   Wt 63.5 kg (140 lb)   BMI 22.60 kg/m²      Remove IUD    Date/Time: 2/27/2024 4:39 PM    Performed by: AUDREY Min  Authorized by: AUDREY Min    Consent:     Consent obtained:  Verbal and written    Consent given by:  Patient    Procedure risks and benefits discussed: yes      Patient questions answered: yes      Patient agrees, verbalizes understanding, and wants to proceed: yes    Procedure:     Removed with no complications: yes    "

## 2024-02-27 NOTE — PROGRESS NOTES
"Chief Complaint    Annual Exam        HPI    ANNUAL - IUD REMOVAL    PAP 2023 NEG / HPV NEG  MAMMO -   DEXA - NEVER  COLON - 2021  Last edited by Shell Jaimes MA on 2024  1:57 PM.         54 y.o.  female presents for annual well woman exam.   She has the Mirena IUD which was inserted in 2018 for AUB.   Amenorrheic with the IUD. FSH, estradiol in menopause range last year. Wants to have removed this year.   Menopausal symptoms include hot flashes, night sweats. She reports they are manageable and not affecting her daily living. She is not on hormone replacement therapy.  She is , female partner. Sexually active, does report low libido.    She denies any breast changes.   Pap hx. normal. Last pap: 2023 and was negative and negative HPV.   Denies pelvic pain.   Denies abnormal vaginal discharge, itching, odor, or dryness.   No urinary or bowel concerns. Colonoscopy: 2021 which showed polyps and is due to return at 5 years  She is a smoker. +MJ use.   PMH: Arthritis, GERD, thyroid nodules   Family h/o breast cancer: MGM, aunt.   Family h/o GYN cancer: None  Family h/o colon cancer: None  Works at [employer] in [place], [employer]. Oldest daughter who had epilepsy passed away end of last year.     /64   Ht 1.676 m (5' 6\")   Wt 63.5 kg (140 lb)   BMI 22.60 kg/m²      Review of Systems   Constitutional:  Positive for unexpected weight change (Wt loss).   HENT:  Positive for sinus pain.    Eyes: Negative.    Respiratory: Negative.     Cardiovascular: Negative.    Gastrointestinal: Negative.    Endocrine:        +Hot flashes  +Hair loss   Genitourinary:  Positive for urgency.        +Vaginal dryness   Musculoskeletal: Negative.    Skin: Negative.    Allergic/Immunologic: Negative.    Neurological: Negative.    Hematological: Negative.    Psychiatric/Behavioral:  Positive for sleep disturbance.         +Anxiety, depression   All other systems reviewed and are " negative.       Physical Exam  Constitutional:       Appearance: Normal appearance.   HENT:      Head: Normocephalic.      Nose: Nose normal.   Cardiovascular:      Rate and Rhythm: Normal rate and regular rhythm.   Pulmonary:      Effort: Pulmonary effort is normal.      Breath sounds: Normal breath sounds.   Chest:   Breasts:     Right: Normal.      Left: Normal.   Abdominal:      General: Abdomen is flat.      Palpations: Abdomen is soft.   Genitourinary:     General: Normal vulva.      Vagina: Normal.      Cervix: Normal.      Uterus: Normal.       Adnexa: Right adnexa normal and left adnexa normal.      Rectum: Normal.      Comments: IUD removed today, see procedure note  Musculoskeletal:         General: Normal range of motion.      Cervical back: Normal range of motion and neck supple.   Skin:     General: Skin is warm and dry.   Neurological:      Mental Status: She is alert.   Psychiatric:         Mood and Affect: Mood normal.         Behavior: Behavior normal.        Assessment/Plan:  1. Well woman exam with routine gynecological exam  -Pap test not collected today. Last pap was in 02/2023 and was negative and negative HPV. Guidelines discussed.   -Mammogram is up to date. Self breast awareness exams reviewed.  -Colonoscopy is up to date.   -Advised yearly well woman exams.   -Follow up sooner if needed.     2. Encounter for IUD removal  - Remove IUD   -Patient tolerated well.

## 2024-04-23 ENCOUNTER — OFFICE VISIT (OUTPATIENT)
Dept: PRIMARY CARE | Facility: CLINIC | Age: 54
End: 2024-04-23
Payer: COMMERCIAL

## 2024-04-23 VITALS
HEIGHT: 67 IN | OXYGEN SATURATION: 99 % | BODY MASS INDEX: 21.35 KG/M2 | HEART RATE: 66 BPM | DIASTOLIC BLOOD PRESSURE: 76 MMHG | WEIGHT: 136 LBS | SYSTOLIC BLOOD PRESSURE: 120 MMHG

## 2024-04-23 DIAGNOSIS — R63.4 UNEXPLAINED WEIGHT LOSS: ICD-10-CM

## 2024-04-23 DIAGNOSIS — R79.89 LOW VITAMIN D LEVEL: ICD-10-CM

## 2024-04-23 DIAGNOSIS — Z00.00 PREVENTATIVE HEALTH CARE: Primary | ICD-10-CM

## 2024-04-23 PROCEDURE — 99214 OFFICE O/P EST MOD 30 MIN: CPT | Performed by: FAMILY MEDICINE

## 2024-04-23 NOTE — PATIENT INSTRUCTIONS
Follow up with Dr Tadeo as needed for the left wrist pain.     Your blood pressure looks very good at 120/76    Labs due in June 2024. Order given today. Please get done fasting.     Mammo on 2/2/24 was good repeat yearly .    Drink more water, make sure the urine is clear.     Please get updated dental examinations.     Continue the trazodone 150 mg at night.     Repeat upper and lower scope in 2026.     Weight is down another 4#, we will continue to monitor this.   Try to get at least 1500 calories per day.    Call if you need any medications.     Follow up in 4 months or sooner as needed.

## 2024-04-23 NOTE — PROGRESS NOTES
Subjective   Patient is a 54 y.o. female presents for follow up     #) unintentional weight loss  - no appetite   - has been more active with working more since her daughter passed away she has more free time   - up to date on colon and breast screenings   - PAP on 2/21/23- neg x 2   - has been dancing on Wednesday evenings    #) acute grief reaction - loss of daughter   - coping as expected, did start a grief group  - grief and trauma yoga on Thursday AM   - sleeping is better with the trazodone   - she found her passed in her bed one morning and performed 20 mins or more of CPR - 5-6 weeks ago - PTSD   - tried ativan and it helped a little- stopped it    - on effexor , not been on anything else, been on this for 20 years. Weaned off last year (2022)  - fatigue and exhausted, reading and journaling   - mother also needed cardioversion and might need a valve replaced.     #) Bilateral knee replacement, 2019 (jan and Nov)  - right shoulder pain, occ hurts  - yoga seems to help     #) had IUD- this winter is 5 years--- will need referral for removal- next year with PA will have it removed.     #) 2019 wt was 235--> 155 --> 136 down 101# pounds--> 148 , with starting medical THC and knee replacement -->down 4# in 90 days     #) feels like she needs to eat a lot to keep her energy levels up   - drinking 3-4 Ensure per day, 2 large meals, lexi bars and Gatorade/Tea/Coffee with Oatmilk, and snacking  - not a lot of options right now due to low funds     #) is getting frequent hypoglycemia - better   - still getting night sweats and hot flashes   - feeling weak and fatigued  - can't keep weight above 150  - CT brain looked good in 2016.   - is a smoker, would like to image the lungs   - EGD and Cscope on 5/19/21- repeat in 5 years     #) left thumb concerns- stable   - left ventral radial wrist ganglion cyst   - swells and goes numb     #) generalized body aches and joint pain  - stable . much better with 100 pound weight  "loss , but still having it   - she has medical THC card and it really helps   - neck , shoulder and thoracic spine tension   - bad reaction to GABAPENTIN AND LYRICA   - had both knees replaced in 2019   - left foot surgery for tibial tendon    #) Wayne with GERD - stable  - stopped her PPI   - did get some teeth removed and getting partial soon   - EGD and Cscope on 5/19/21- repeat in 5 years   - pt reports improved with THC edibles     #) 7/22/23- walked on a rack and it smacked her in the forehead just above the right  inner eye   - immediate and bruising  - then 2 days later started getting right inner eye bruising and then spread to the inner aspect of the left eye.   - no imaging   - recent eye examination and vision is fine  - still intermittent headaches, 3 days per week, worse with sinuses are stuffy    #) tobacco use- for years.   - 1/2 ppd     #) Preventive:  Etoh: occasional   BMI: 37--> 23  BP: 120/78  Vaccinations: none  PAP: needs  Mammogram: 2/2/23  Colonoscopy: n/a til 51  DEXA: n/a  ASA 81: no  Low Dose CT: n/a til 56  HepC screen: n/a  HIV screen:  Difficulty staying asleep and sleeps 5 hours at a time  Fasting blood work: now     Review of system are negative unless otherwise stated in the HPI.     Objective     /76   Pulse 66   Ht 1.689 m (5' 6.5\")   Wt 61.7 kg (136 lb)   SpO2 99%   BMI 21.62 kg/m²     Physical Examination  GEN: A+O, no acute distress  HEENT: NC/AT, Oropharynx clear, no exudates, TM visualized, Extraoccular muscles intact, no facial droop; no thyromegaly or cervical LAD  RESP: CTAB, no wheezes   CV: RRR, no murmurs  ABD: soft, non-tender, + BS  SKIN: no rashes or bruising, no peripheral edema   NEURO: CN II-XII grossly intact, moves all extremities equally, no tremor   PSYCH: normal affect, appropriate mood     Assessment/Plan     Follow up with Dr Tadeo as needed for the left wrist pain.     Your blood pressure looks very good at 120/76    Labs due in June 2024. " Order given today. Please get done fasting.     Mammo on 2/2/24 was good repeat yearly .    Drink more water, make sure the urine is clear.     Please get updated dental examinations.     Continue the trazodone 150 mg at night.     Repeat upper and lower scope in 2026.     Weight is down another 4#, we will continue to monitor this.   Try to get at least 1500 calories per day.    Call if you need any medications.     Follow up in 4 months or sooner as needed.

## 2024-05-30 ENCOUNTER — LAB (OUTPATIENT)
Dept: LAB | Facility: LAB | Age: 54
End: 2024-05-30
Payer: COMMERCIAL

## 2024-05-30 DIAGNOSIS — Z00.00 PREVENTATIVE HEALTH CARE: ICD-10-CM

## 2024-05-30 DIAGNOSIS — R79.89 LOW VITAMIN D LEVEL: ICD-10-CM

## 2024-05-30 DIAGNOSIS — R63.4 UNEXPLAINED WEIGHT LOSS: ICD-10-CM

## 2024-05-30 LAB
25(OH)D3 SERPL-MCNC: 60 NG/ML (ref 30–100)
ALBUMIN SERPL BCP-MCNC: 4.1 G/DL (ref 3.4–5)
ALP SERPL-CCNC: 75 U/L (ref 33–110)
ALT SERPL W P-5'-P-CCNC: 17 U/L (ref 7–45)
ANION GAP SERPL CALC-SCNC: 9 MMOL/L (ref 10–20)
AST SERPL W P-5'-P-CCNC: 17 U/L (ref 9–39)
BASOPHILS # BLD AUTO: 0.04 X10*3/UL (ref 0–0.1)
BASOPHILS NFR BLD AUTO: 0.5 %
BILIRUB SERPL-MCNC: 0.4 MG/DL (ref 0–1.2)
BUN SERPL-MCNC: 6 MG/DL (ref 6–23)
CALCIUM SERPL-MCNC: 9.6 MG/DL (ref 8.6–10.3)
CHLORIDE SERPL-SCNC: 105 MMOL/L (ref 98–107)
CHOLEST SERPL-MCNC: 154 MG/DL (ref 0–199)
CHOLESTEROL/HDL RATIO: 2.9
CO2 SERPL-SCNC: 32 MMOL/L (ref 21–32)
CORTIS SERPL-MCNC: 29.5 UG/DL (ref 2.5–20)
CORTIS SERPL-MCNC: 30.2 UG/DL (ref 2.5–20)
CREAT SERPL-MCNC: 0.78 MG/DL (ref 0.5–1.05)
EGFRCR SERPLBLD CKD-EPI 2021: >90 ML/MIN/1.73M*2
EOSINOPHIL # BLD AUTO: 0.02 X10*3/UL (ref 0–0.7)
EOSINOPHIL NFR BLD AUTO: 0.3 %
ERYTHROCYTE [DISTWIDTH] IN BLOOD BY AUTOMATED COUNT: 12.9 % (ref 11.5–14.5)
GLUCOSE SERPL-MCNC: 82 MG/DL (ref 74–99)
HCT VFR BLD AUTO: 43.8 % (ref 36–46)
HDLC SERPL-MCNC: 53.4 MG/DL
HGB BLD-MCNC: 14.3 G/DL (ref 12–16)
IMM GRANULOCYTES # BLD AUTO: 0.02 X10*3/UL (ref 0–0.7)
IMM GRANULOCYTES NFR BLD AUTO: 0.3 % (ref 0–0.9)
LDLC SERPL CALC-MCNC: 87 MG/DL
LYMPHOCYTES # BLD AUTO: 2.62 X10*3/UL (ref 1.2–4.8)
LYMPHOCYTES NFR BLD AUTO: 34.5 %
MCH RBC QN AUTO: 31.5 PG (ref 26–34)
MCHC RBC AUTO-ENTMCNC: 32.6 G/DL (ref 32–36)
MCV RBC AUTO: 97 FL (ref 80–100)
MONOCYTES # BLD AUTO: 0.73 X10*3/UL (ref 0.1–1)
MONOCYTES NFR BLD AUTO: 9.6 %
NEUTROPHILS # BLD AUTO: 4.17 X10*3/UL (ref 1.2–7.7)
NEUTROPHILS NFR BLD AUTO: 54.8 %
NON HDL CHOLESTEROL: 101 MG/DL (ref 0–149)
NRBC BLD-RTO: 0 /100 WBCS (ref 0–0)
PLATELET # BLD AUTO: 193 X10*3/UL (ref 150–450)
POTASSIUM SERPL-SCNC: 3.7 MMOL/L (ref 3.5–5.3)
PROT SERPL-MCNC: 6.3 G/DL (ref 6.4–8.2)
RBC # BLD AUTO: 4.54 X10*6/UL (ref 4–5.2)
SODIUM SERPL-SCNC: 142 MMOL/L (ref 136–145)
TRIGL SERPL-MCNC: 69 MG/DL (ref 0–149)
TSH SERPL-ACNC: 2.78 MIU/L (ref 0.44–3.98)
VLDL: 14 MG/DL (ref 0–40)
WBC # BLD AUTO: 7.6 X10*3/UL (ref 4.4–11.3)

## 2024-05-30 PROCEDURE — 82306 VITAMIN D 25 HYDROXY: CPT

## 2024-05-30 PROCEDURE — 36415 COLL VENOUS BLD VENIPUNCTURE: CPT

## 2024-06-01 DIAGNOSIS — R79.89 ELEVATED CORTISOL LEVEL: Primary | ICD-10-CM

## 2024-06-03 ENCOUNTER — E-CONSULT (OUTPATIENT)
Dept: ENDOCRINOLOGY | Facility: CLINIC | Age: 54
End: 2024-06-03
Payer: COMMERCIAL

## 2024-06-03 DIAGNOSIS — R79.89 ELEVATED CORTISOL LEVEL: ICD-10-CM

## 2024-06-03 DIAGNOSIS — R63.4 UNEXPLAINED WEIGHT LOSS: Primary | ICD-10-CM

## 2024-06-03 NOTE — PROGRESS NOTES
Thank you for the question.    I am glad you checked the cortisol, with weight loss history, it is most likely secondary , she needs perhaps gastroenterology or others to understand whys he is losing weight. You could order 24 hours free cortisol with 24 hours creatinine levels to make sure , it is physiologic response to another stress.    I hope this helps

## 2024-06-10 ENCOUNTER — LAB (OUTPATIENT)
Dept: LAB | Facility: LAB | Age: 54
End: 2024-06-10
Payer: COMMERCIAL

## 2024-06-10 DIAGNOSIS — R79.89 ELEVATED CORTISOL LEVEL: ICD-10-CM

## 2024-06-10 DIAGNOSIS — R63.4 UNEXPLAINED WEIGHT LOSS: ICD-10-CM

## 2024-06-10 PROCEDURE — 82570 ASSAY OF URINE CREATININE: CPT

## 2024-06-10 PROCEDURE — 82530 CORTISOL FREE: CPT

## 2024-06-10 PROCEDURE — 81050 URINALYSIS VOLUME MEASURE: CPT

## 2024-06-11 LAB
COLLECT DURATION TIME SPEC: 24 HRS
CREAT 24H UR-MCNC: 41.8 MG/DL (ref 20–320)
CREAT 24H UR-MRATE: 0.92 G/24 H (ref 0.67–1.59)
SPECIMEN VOL 24H UR: 2200 ML

## 2024-06-14 LAB
ANNOTATION COMMENT IMP: NORMAL
COLLECT DURATION TIME SPEC: 24 HR
CORTIS F 24H UR HPLC-MCNC: 8.68 UG/L
CORTIS F 24H UR-MRATE: 19.1 UG/D
CORTIS F/CREAT 24H UR: 20.19 UG/G CRT
CREAT 24H UR-MRATE: 946 MG/D (ref 500–1400)
CREAT UR-MCNC: 43 MG/DL
SPECIMEN VOL ?TM UR: 2200 ML

## 2024-06-17 DIAGNOSIS — F51.02 ADJUSTMENT INSOMNIA: ICD-10-CM

## 2024-06-17 DIAGNOSIS — R53.82 CHRONIC FATIGUE: ICD-10-CM

## 2024-06-17 DIAGNOSIS — K21.9 GERD WITHOUT ESOPHAGITIS: Primary | ICD-10-CM

## 2024-06-17 DIAGNOSIS — R06.83 SNORES: ICD-10-CM

## 2024-06-17 RX ORDER — PANTOPRAZOLE SODIUM 40 MG/1
40 TABLET, DELAYED RELEASE ORAL
Qty: 90 TABLET | Refills: 3 | Status: SHIPPED | OUTPATIENT
Start: 2024-06-17 | End: 2025-06-17

## 2024-08-20 ENCOUNTER — APPOINTMENT (OUTPATIENT)
Dept: PRIMARY CARE | Facility: CLINIC | Age: 54
End: 2024-08-20
Payer: COMMERCIAL

## 2024-08-29 ENCOUNTER — APPOINTMENT (OUTPATIENT)
Dept: PRIMARY CARE | Facility: CLINIC | Age: 54
End: 2024-08-29
Payer: COMMERCIAL

## 2024-08-29 VITALS
OXYGEN SATURATION: 99 % | HEART RATE: 68 BPM | WEIGHT: 141 LBS | BODY MASS INDEX: 22.13 KG/M2 | DIASTOLIC BLOOD PRESSURE: 84 MMHG | HEIGHT: 67 IN | SYSTOLIC BLOOD PRESSURE: 120 MMHG

## 2024-08-29 DIAGNOSIS — Q82.8 POROKERATOSIS: Primary | ICD-10-CM

## 2024-08-29 PROCEDURE — 3008F BODY MASS INDEX DOCD: CPT | Performed by: FAMILY MEDICINE

## 2024-08-29 PROCEDURE — 99214 OFFICE O/P EST MOD 30 MIN: CPT | Performed by: FAMILY MEDICINE

## 2024-08-29 NOTE — PROGRESS NOTES
Subjective   Patient is a 54 y.o. female presents for follow up     #) unintentional weight loss with slightly elevated cortisol levels  - referred to endo, appt to see dr Zee   - both daughters were dx with mitochondrial disorders  - has started Co-Q10, Scetly L - Carnitine and digetive enzyme and seems to be helping, gained 5#   - has not started the riboflavin yet   - appetite is still low  - has been more active with working more since her daughter passed away she has more free time   - started on shakes and   - is getting frequent hypoglycemia - better still   - still getting night sweats and hot flashes   - feeling weak and fatigued  - can't keep weight above 150  - CT brain looked good in 2016.   - is a smoker, would like to image the lungs   - EGD and Cscope on 5/19/21- repeat in 5 years     #) acute grief reaction - loss of daughter in Spring 2023  - also started virtual visits with a therapist  - stated listening the Dr Patricio Mahajan Podcast to help regular hormones and circadian rhythm   - coping as expected, did start a grief group  - grief and trauma yoga on Thursday AM   - sleeping is better with the trazodone prn. Down to 75 mg at bedtime ( was up to 150mg at first)  - she found her passed in her bed one morning and performed 20 mins or more of CPR - 5-6 weeks ago - PTSD     #) epigastric fluttering  - has blowing off as anxiety or something  - happening almost daily  - not extremely painful, some soreness  - no correlation to food intake  - not associated sweats, nausea, chills, radiation, chest symptoms or shortness of breath or dizziness   - mother also needed cardioversion and might need a valve replaced.     #) Bilateral knee replacement, 2019 (jan and Nov)  - right shoulder pain, occ hurts- stable   - does wear a posture brace sometimes which helps   - pulled her rib a few months ago   - yoga seems to help     #) had IUD- this winter is 5 years--- will need referral for removal- next year  "with PA will have it removed.   - up to date on colon and breast screenings   - PAP on 2/21/23- neg x 2     #) 2019 wt was 235--> 155 --> 136 down 101# pounds--> 148 , with starting medical THC and knee replacement -->down to 136--> 132--> 141 today     #) left thumb concerns- stable   - also left index finger with arthritis   - saw Dr Tadeo     #) fibromyalgia- pain is better but still having it   - she has medical THC card and it really helps   - neck , shoulder and thoracic spine tension   - bad reaction to GABAPENTIN AND LYRICA   - had both knees replaced in 2019   - left foot surgery for tibial tendon    #) Wayne with GERD - stable  - stopped her PPI   - did get some teeth removed and getting partial soon   - EGD and Cscope on 5/19/21- repeat in 5 years   - pt reports improved with THC edibles     #) 7/22/23- walked on a rack and it smacked her in the forehead just above the right  inner eye   - immediate and bruising  - then 2 days later started getting right inner eye bruising and then spread to the inner aspect of the left eye.   - no imaging   - recent eye examination and vision is fine  - still intermittent headaches, 3 days per week, worse with sinuses are stuffy    #) tobacco use- for years.   - 1/2 ppd     #) Preventive:  Etoh: occasional   BMI: 37--> 23  BP: 120/78--> 120/84  Vaccinations: none  PAP: needs  Mammogram: 2/2/23, 2/2/24   Colonoscopy: n/a til 51  DEXA: n/a  ASA 81: no  Low Dose CT: n/a til 56  HepC screen: n/a  HIV screen:  Difficulty staying asleep and sleeps 5 hours at a time  Fasting blood work: now     Review of system are negative unless otherwise stated in the HPI.     Objective     /84   Pulse 68   Ht 1.689 m (5' 6.5\")   Wt 64 kg (141 lb)   SpO2 99%   BMI 22.42 kg/m²     Physical Examination  GEN: A+O, no acute distress  HEENT: NC/AT, Oropharynx clear, no exudates, TM visualized, Extraoccular muscles intact, no facial droop; no thyromegaly or cervical LAD  RESP: CTAB, " no wheezes   CV: RRR, no murmurs  ABD: soft, non-tender, + BS  SKIN: no rashes or bruising, no peripheral edema   NEURO: CN II-XII grossly intact, moves all extremities equally, no tremor   PSYCH: normal affect, appropriate mood     Assessment/Plan     Follow up with Dr Tadeo as needed for the left wrist pain as needed      Your blood pressure looks good today    Labs due again in May 2024    Mammo on 2/2/24 was good repeat yearly .    Drink more water, make sure the urine is clear.     Continue the supplements if you feel it is helping with out side effects.     Follow up with Dr Zee for the weight loss, elevated cortisol, to make sure all is good.     Please get updated dental examinations.    Continue the trazodone 75 mg at night.     Repeat upper and lower scope in 2026.     Glad the weight is stable, and actually you have gained some weight   Try to get at least 1500 calories per day.    Call if you need any medications.     Follow up in 6 months or sooner as needed.

## 2024-08-29 NOTE — PATIENT INSTRUCTIONS
Follow up with Dr Tadeo as needed for the left wrist pain as needed      Your blood pressure looks good today    Labs due again in May 2024    Mammo on 2/2/24 was good repeat yearly .    Drink more water, make sure the urine is clear.     Continue the supplements if you feel it is helping with out side effects.     Follow up with Dr Zee for the weight loss, elevated cortisol, to make sure all is good.     Please get updated dental examinations.    Continue the trazodone 75 mg at night.     Repeat upper and lower scope in 2026.     Glad the weight is stable, and actually you have gained some weight   Try to get at least 1500 calories per day.    Call if you need any medications.     Follow up in 6 months or sooner as needed.

## 2024-10-23 ENCOUNTER — APPOINTMENT (OUTPATIENT)
Dept: ENDOCRINOLOGY | Facility: CLINIC | Age: 54
End: 2024-10-23
Payer: COMMERCIAL

## 2024-10-23 VITALS
SYSTOLIC BLOOD PRESSURE: 126 MMHG | BODY MASS INDEX: 23.37 KG/M2 | HEART RATE: 72 BPM | WEIGHT: 147 LBS | DIASTOLIC BLOOD PRESSURE: 81 MMHG

## 2024-10-23 DIAGNOSIS — E04.2 MULTIPLE THYROID NODULES: ICD-10-CM

## 2024-10-23 DIAGNOSIS — R79.89 ABNORMAL CORTISOL LEVEL: Primary | ICD-10-CM

## 2024-10-23 PROCEDURE — 99204 OFFICE O/P NEW MOD 45 MIN: CPT | Performed by: INTERNAL MEDICINE

## 2024-10-23 RX ORDER — OMEGA-3-ACID ETHYL ESTERS 1 G/1
1 CAPSULE, LIQUID FILLED ORAL 2 TIMES DAILY
COMMUNITY

## 2024-10-23 ASSESSMENT — ENCOUNTER SYMPTOMS
FEVER: 0
DIARRHEA: 0
ROS SKIN COMMENTS: DRY
FATIGUE: 0
UNEXPECTED WEIGHT CHANGE: 1
VOMITING: 0
HEADACHES: 0
SHORTNESS OF BREATH: 0
CONSTIPATION: 0
ABDOMINAL PAIN: 0
TROUBLE SWALLOWING: 0
TREMORS: 0
NAUSEA: 0
PALPITATIONS: 0
ARTHRALGIAS: 1
WEAKNESS: 0
NERVOUS/ANXIOUS: 1
NECK PAIN: 1
BACK PAIN: 1

## 2024-10-23 NOTE — PATIENT INSTRUCTIONS
RECOMMENDATIONS  No evidence of Cushing's syndrome    Recommend repeat ultrasound thyroid    Discuss any need for lung cancer screening with Dr. Toure.   Consider genetics consultation for mitochondrial disease    Follow up to be determined

## 2024-10-23 NOTE — PROGRESS NOTES
History Of Present Illness  Duran Cho is a 54 y.o. female with abnormal cortisol    Weight loss started from  following knee replacements  Young family friend committed suicide in     Daughter  of seizure disorder  Mitochondrial complex 3    Started Levo-carnitine, Co-Q-10 and Total Digestive Enzymes for the past 3 months  Weight gain to 145 lbs.      Multinodular goiter  Right lobe nodule FNAB benign 3/17/23    Family history of nodular goiter.  Daughter had partial thyroidectomy at age 24.     Past Medical History  She has a past medical history of Cervicalgia (2021), Personal history of other diseases of the female genital tract, Personal history of other diseases of the respiratory system (2021), Personal history of other diseases of the respiratory system (2021), Personal history of other medical treatment (2019), Postnasal drip (2021), and Unspecified osteoarthritis, unspecified site.    Surgical History  She has a past surgical history that includes Other surgical history (2018); Tubal ligation (2018); Other surgical history (2018);  section, classic (2018); and Tonsillectomy (2018).     Social History  She reports that she has been smoking cigarettes. She has a 10 pack-year smoking history. She has never used smokeless tobacco. She reports current alcohol use. She reports current drug use. Drug: Marijuana.    Family History  Family History   Problem Relation Name Age of Onset    Hypertension Mother      Osteoporosis Mother      Other (alcohol addiction) Father      Hypertension Father      Breast cancer Mother's Sister      Colon cancer Mother's Sister      Breast cancer Maternal Grandmother      Osteoporosis Maternal Grandmother      Breast cancer Maternal Great-Grandmother      Breast cancer Other         Medications  Current Outpatient Medications   Medication Instructions    levocarnitine HCl (ACETYL-L-CARNITINE MISC)  500 mg    medical cannabis CBD OIL    omega-3 acid ethyl esters (LOVAZA) 1 g, oral, 2 times daily    pantoprazole (PROTONIX) 40 mg, oral, Daily before breakfast, Do not crush, chew, or split.    traZODone (DESYREL) 150 mg, oral, Nightly PRN       Allergies  Latex, Carrot, Mold, and Citalopram    Review of Systems   Constitutional:  Positive for unexpected weight change. Negative for fatigue and fever.   HENT:  Positive for hearing loss. Negative for trouble swallowing.    Eyes:  Negative for visual disturbance.   Respiratory:  Negative for shortness of breath.    Cardiovascular:  Negative for chest pain and palpitations.   Gastrointestinal:  Negative for abdominal pain, constipation, diarrhea, nausea and vomiting.   Endocrine: Negative for cold intolerance and heat intolerance.   Musculoskeletal:  Positive for arthralgias, back pain and neck pain.   Skin:  Negative for rash.        dry   Neurological:  Negative for tremors, weakness and headaches.   Psychiatric/Behavioral:  The patient is nervous/anxious.          Last Recorded Vitals  Blood pressure 126/81, pulse 72, weight 66.7 kg (147 lb).    Physical Exam  Constitutional:       General: She is not in acute distress.     Appearance: She is normal weight.   HENT:      Head: Normocephalic.      Mouth/Throat:      Mouth: Mucous membranes are moist.   Eyes:      Extraocular Movements: Extraocular movements intact.   Neck:      Comments: Thyroid multinodular right lobe 2x normal size, left lobe unremarkable, mobile with swallow.   Cardiovascular:      Pulses:           Radial pulses are 2+ on the right side and 2+ on the left side.   Musculoskeletal:      Right lower leg: No edema.      Left lower leg: No edema.   Lymphadenopathy:      Cervical: Cervical adenopathy present.      Right cervical: Posterior cervical adenopathy (Small nodes) present. No superficial cervical adenopathy.     Left cervical: No superficial or posterior cervical adenopathy.   Neurological:       Mental Status: She is alert.      Motor: No tremor.   Psychiatric:         Mood and Affect: Affect normal.          Relevant Results  Lab Results   Component Value Date    TSH 2.78 05/30/2024      Latest Reference Range & Units 05/30/24 09:05 06/10/24 10:42   GLUCOSE 74 - 99 mg/dL 82    SODIUM 136 - 145 mmol/L 142    POTASSIUM 3.5 - 5.3 mmol/L 3.7    CHLORIDE 98 - 107 mmol/L 105    Bicarbonate 21 - 32 mmol/L 32    Anion Gap 10 - 20 mmol/L 9 (L)    Blood Urea Nitrogen 6 - 23 mg/dL 6    Creatinine 0.50 - 1.05 mg/dL 0.78    EGFR >60 mL/min/1.73m*2 >90    Calcium 8.6 - 10.3 mg/dL 9.6    Albumin 3.4 - 5.0 g/dL 4.1    Alkaline Phosphatase 33 - 110 U/L 75    ALT 7 - 45 U/L 17    AST 9 - 39 U/L 17    Bilirubin Total 0.0 - 1.2 mg/dL 0.4    HDL CHOLESTEROL mg/dL 53.4    Cholesterol/HDL Ratio  2.9    LDL Calculated <=99 mg/dL 87    VLDL 0 - 40 mg/dL 14    TRIGLYCERIDES 0 - 149 mg/dL 69    Non HDL Cholesterol 0 - 149 mg/dL 101    Total Protein 6.4 - 8.2 g/dL 6.3 (L)    CHOLESTEROL 0 - 199 mg/dL 154    CORTISOL 2.5 - 20.0 ug/dL  2.5 - 20.0 ug/dL 29.5 (H)  30.2 (H)    Thyroid Stimulating Hormone 0.44 - 3.98 mIU/L 2.78    Vitamin D, 25-Hydroxy, Total 30 - 100 ng/mL 60    WBC 4.4 - 11.3 x10*3/uL 7.6    nRBC 0.0 - 0.0 /100 WBCs 0.0    RBC 4.00 - 5.20 x10*6/uL 4.54    HEMOGLOBIN 12.0 - 16.0 g/dL 14.3    HEMATOCRIT 36.0 - 46.0 % 43.8    MCV 80 - 100 fL 97    MCH 26.0 - 34.0 pg 31.5    MCHC 32.0 - 36.0 g/dL 32.6    RED CELL DISTRIBUTION WIDTH 11.5 - 14.5 % 12.9    Platelets 150 - 450 x10*3/uL 193    Neutrophils % 40.0 - 80.0 % 54.8    Immature Granulocytes %, Automated 0.0 - 0.9 % 0.3    Lymphocytes % 13.0 - 44.0 % 34.5    Monocytes % 2.0 - 10.0 % 9.6    Eosinophils % 0.0 - 6.0 % 0.3    Basophils % 0.0 - 2.0 % 0.5    Neutrophils Absolute 1.20 - 7.70 x10*3/uL 4.17    Immature Granulocytes Absolute, Automated 0.00 - 0.70 x10*3/uL 0.02    Lymphocytes Absolute 1.20 - 4.80 x10*3/uL 2.62    Monocytes Absolute 0.10 - 1.00 x10*3/uL 0.73     Eosinophils Absolute 0.00 - 0.70 x10*3/uL 0.02    Basophils Absolute 0.00 - 0.10 x10*3/uL 0.04    Creatinine, Urine 20.0 - 320.0 mg/dL  41.8   Collection period hr  hrs  24  24   Cortisol UR Free Per Volume ug/L  8.68   Cortisol/Creatinine Ratio ug/g CRT  20.19   Creatinine, 24 Hour Urine 500 - 1400 mg/d  0.67 - 1.59 g/24 h  946  0.92   Creatinine, Urine - per volume mg/dL  43   Total Volume mL  2200   Cortisol UR Free Per 24H <=45.0 ug/d  19.1   Cortisol, Urine Intepretation   See Note   Urine Volume mL  2,200      Latest Reference Range & Units 07/17/23 08:40   CORTISOL 2.5 - 20.0 ug/dL 24.4 (H)         US THYROID (2/3/23)  FINDINGS:     RIGHT LOBE:  5 x 1.9 x 1.9 cm. Mid zone predominantly solid isoechoic nodule  measuring 2.7 x 1.1 x 1.7 cm, TR 4. Lower pole solid isoechoic nodule  measuring 1.1 x 0.8 x 1.1 cm, TR 3.     LEFT LOBE:  4 x 1.1 x 1.4 cm.     ISTHMUS:  0.3 cm.     IMPRESSION:  Right-sided TR 4 nodule measuring 2.7 cm, meets criteria for sampling.     Right-sided TR 3 nodule which does not meet criteria for sampling or  follow-up.    US NECK (6/16/23)  FINDINGS:  Multiple subcentimeter hypoechoic areas of nodularity of similar  morphology are noted in the region of the palpable abnormality. The  largest is approximately 8 x 3 x 5 mm. These are likely small lymph  nodes.      Impression   Multiple subcentimeter hypoechoic nodules in the region of the  palpable abnormality likely small lymph nodes. These are nonspecific  and may be reactive       IMPRESSION  ABNORMAL CORTISOL  Repeat elevations of AM cortisol  Advised spot cortisol does not diagnose hypercortisolism  24h was cortisol was normal  Discussed episodic or stress induced cortisol elevation vs. persistent elevations in Cushing's syndrome  No symptoms or exam to suggest Cushing's syndrome    History of weight loss, not consistent with hypercortisolism  No indication of adrenal insufficiency  Patient is regaining weight on supplements she  knows for treating mitochondrial disease  She carries no diagnosis of mitochondrial disease  She is aware that mitochondria are maternally inherited    MULTINODULAR GOITER  Multinodular right lobe  Euthyroid  Benign FNAB of dominant right lobe nodule last year  Weight loss is not attributable to thyroid status      RECOMMENDATIONS  No further cortisol testing indicated    Recommend repeat ultrasound thyroid    Discuss any need for lung cancer screening with Dr. Toure.   Consider genetics consultation for mitochondrial disease    Follow up to be determined

## 2025-01-14 ENCOUNTER — APPOINTMENT (OUTPATIENT)
Dept: PODIATRY | Facility: CLINIC | Age: 55
End: 2025-01-14
Payer: COMMERCIAL

## 2025-01-14 DIAGNOSIS — M79.672 PAIN IN BOTH FEET: Primary | ICD-10-CM

## 2025-01-14 DIAGNOSIS — M79.671 PAIN IN BOTH FEET: Primary | ICD-10-CM

## 2025-01-14 DIAGNOSIS — L85.1 ACQUIRED PLANTAR POROKERATOSIS: ICD-10-CM

## 2025-01-14 PROCEDURE — 99202 OFFICE O/P NEW SF 15 MIN: CPT | Performed by: PODIATRIST

## 2025-01-14 NOTE — PROGRESS NOTES
This is a 54 y.o. female new patient for R foot pain    History of Present Illness:   Patient states they are here for skin lesion to right foot  Denies trauma  Has pain when wb  Was present years ago  Recently started to bother again  No other pedal complaints    Past Medical History  Past Medical History:   Diagnosis Date    Cervicalgia 08/16/2021    Tenderness of neck    Personal history of other diseases of the female genital tract     History of abnormal uterine bleeding    Personal history of other diseases of the respiratory system 03/12/2021    History of reactive airway disease    Personal history of other diseases of the respiratory system 08/16/2021    History of acute sinusitis    Personal history of other medical treatment 08/12/2019    History of screening mammography    Postnasal drip 08/04/2021    Postnasal discharge    Unspecified osteoarthritis, unspecified site     Osteoarthritis (arthritis due to wear and tear of joints)       Medications and Allergies have been reviewed.    Review Of Systems:  GENERAL: No weight loss, malaise or fevers.  HEENT: Negative for frequent or significant headaches,   RESPIRATORY: Negative for cough, wheezing or shortness of breath.  CARDIOVASCULAR: Negative for chest pain, leg swelling or palpitations.    Examination of Both Lower Extremities:   Objective:   Vasc: DP and PT pulses are palpable bilateral.  CFT is less than 3 seconds bilateral.  Skin temperature is warm to cool proximal to distal bilateral.      Neuro: Vibratory, light touch and proprioception are intact bilateral.      Derm: Nails 1-5 bilateral are intact.  Right plantar foot notes hpk tissue with nucleated core noted. No pin point bleeding    Ortho: Muscle strength is 5/5 for all pedal groups tested.     1. Pain in both feet        2. Acquired plantar porokeratosis              Patient exam and eval  All hpk tissue was debrided to smooth skin  Keep filed down  Fu prn  Patient was in agreement to this  plan. All questions answered.      Jackie Ayala, KEREN  [phone]  [facility]  Fax: [phone]

## 2025-01-27 ENCOUNTER — PATIENT MESSAGE (OUTPATIENT)
Dept: PRIMARY CARE | Facility: CLINIC | Age: 55
End: 2025-01-27
Payer: COMMERCIAL

## 2025-01-27 DIAGNOSIS — J01.00 ACUTE NON-RECURRENT MAXILLARY SINUSITIS: Primary | ICD-10-CM

## 2025-01-29 PROBLEM — J01.10 ACUTE NON-RECURRENT FRONTAL SINUSITIS: Status: ACTIVE | Noted: 2025-01-29

## 2025-01-30 RX ORDER — AZITHROMYCIN 500 MG/1
500 TABLET, FILM COATED ORAL DAILY
Qty: 5 TABLET | Refills: 0 | Status: SHIPPED | OUTPATIENT
Start: 2025-01-30 | End: 2025-02-04

## 2025-02-20 ENCOUNTER — TELEMEDICINE (OUTPATIENT)
Dept: PRIMARY CARE | Facility: CLINIC | Age: 55
End: 2025-02-20
Payer: COMMERCIAL

## 2025-02-20 DIAGNOSIS — Z12.31 ENCOUNTER FOR SCREENING MAMMOGRAM FOR MALIGNANT NEOPLASM OF BREAST: ICD-10-CM

## 2025-02-20 DIAGNOSIS — F41.9 ANXIETY AND DEPRESSION: Primary | ICD-10-CM

## 2025-02-20 DIAGNOSIS — F32.A ANXIETY AND DEPRESSION: Primary | ICD-10-CM

## 2025-02-20 RX ORDER — FLUOXETINE 10 MG/1
10 CAPSULE ORAL DAILY
Qty: 90 CAPSULE | Refills: 3 | Status: SHIPPED | OUTPATIENT
Start: 2025-02-20 | End: 2026-02-20

## 2025-02-20 NOTE — PROGRESS NOTES
Subjective   Patient is a 55 y.o. female presents for anxiety    Virtual or Telephone Consent    While technically available, the patient was unable or unwilling to consent to connect via audio/video telehealth technology; therefore, I performed this visit using a real-time audio only connection between Duran Cho location: home & Cydney Toure DO location: office.  Verbal consent was requested and obtained from Duran Cho on this date, 02/20/25 for a telehealth visit.     #) panic attack   Feeling super overwhelmed with panic attacks   Had to call of from work on Monday, never had to do this before   Then took an ativan Monday night and it worked after 30 mins   - feeling very unorganized   Trying to meditate  The cold weather is dragging  - has been going dancing on Wednesdays   - acute grief reaction - loss of daughter in Spring 2023  - coping as expected, did start going to a grief group  - grief and trauma yoga on Thursday AM   - sleeping is better with the trazodone prn. Down to 75 mg at bedtime ( was up to 150mg at first)  - she found her passed in her bed one morning and performed 20 mins or more of CPR - 5-6 weeks ago - PTSD   - was on effexor in the past for 25 yrs , celexa made her very tired   - unsure if tried anything     #) weight is stable at 150s   - referred to [facility], appt to see dr Zee   - both daughters were dx with mitochondrial disorders  - has started Co-Q10, Scetly L - Carnitine and digetive enzyme and seems to be helping, gained 5#   - has not started the riboflavin yet   - has been more active with working more since her daughter passed away she has more free time   - night sweats and hot flashes -- resolved for the most part   - CT brain looked good in 2016.   - EGD and Cscope on 5/19/21- repeat in 5 years     #) Bilateral knee replacement, 2019 (jan and Nov)  - right shoulder pain, occ hurts- stable   - does wear a posture brace sometimes which helps   - pulled her rib a few  months ago   - yoga seems to help     #) had IUD- this winter is 5 years--- will need referral for removal- next year with PA will have it removed.   - up to date on colon and breast screenings   - PAP on 2/21/23- neg x 2     #) 2019 wt was 235--> 155 --> 136 down 101# pounds--> 148 , with starting medical THC and knee replacement -->down to 136--> 132--> 141 --> 155 today     #) left thumb concerns- stable   - also left index finger with arthritis   - saw Dr Tadeo     #) fibromyalgia- pain is better but still having it   - she has medical THC card and it really helps   - neck , shoulder and thoracic spine tension   - bad reaction to GABAPENTIN AND LYRICA   - had both knees replaced in 2019   - left foot surgery for tibial tendon    #) Wayne with GERD - stable  - stopped her PPI   - did get some teeth removed and getting partial soon   - EGD and Cscope on 5/19/21- repeat in 5 years   - pt reports improved with THC edibles     #) 7/22/23- walked on a rack and it smacked her in the forehead just above the right  inner eye   - immediate and bruising  - then 2 days later started getting right inner eye bruising and then spread to the inner aspect of the left eye.   - no imaging   - recent eye examination and vision is fine  - still intermittent headaches, 3 days per week, worse with sinuses are stuffy    #) tobacco use- for years.     Review of system are negative unless otherwise stated in the HPI.     Objective     There were no vitals taken for this visit.    Physical Examination  GEN: A+O, no acute distress  HEENT: NC/AT, Oropharynx clear, no exudates, TM visualized, Extraoccular muscles intact, no facial droop; no thyromegaly or cervical LAD  RESP: CTAB, no wheezes   CV: RRR, no murmurs  ABD: soft, non-tender, + BS  SKIN: no rashes or bruising, no peripheral edema   NEURO: CN II-XII grossly intact, moves all extremities equally, no tremor   PSYCH: normal affect, appropriate mood     Assessment/Plan     Please  start the Fluoxetine 10 mg daily     Try to lean off the trazodone 75 mg . Take only as needed for insomnia     Follow up with Dr Tadeo as needed for the left wrist pain as needed     Labs due again in May 2025    Mammo on 2/2/24 was good repeat yearly . Order placed     Drink more water, make sure the urine is clear.     Continue the supplements if you feel it is helping with out side effects.     Please get updated dental examinations.    Continue the trazodone 75 mg at night.     Repeat upper and lower scope in 2026.     Glad the weight is stable, and actually you have gained some weight   Try to get at least 1500 calories per day.    Call if you need any medications.     Follow up in 2 weeks as scheduled     12:33 mins spent on the phone call

## 2025-03-03 ASSESSMENT — PROMIS GLOBAL HEALTH SCALE
RATE_QUALITY_OF_LIFE: GOOD
CARRYOUT_PHYSICAL_ACTIVITIES: COMPLETELY
CARRYOUT_SOCIAL_ACTIVITIES: GOOD
RATE_MENTAL_HEALTH: FAIR
RATE_PHYSICAL_HEALTH: GOOD
EMOTIONAL_PROBLEMS: OFTEN
RATE_AVERAGE_PAIN: 3
RATE_SOCIAL_SATISFACTION: GOOD
RATE_GENERAL_HEALTH: GOOD
RATE_AVERAGE_FATIGUE: MODERATE

## 2025-03-04 ENCOUNTER — APPOINTMENT (OUTPATIENT)
Dept: PRIMARY CARE | Facility: CLINIC | Age: 55
End: 2025-03-04
Payer: COMMERCIAL

## 2025-03-04 VITALS
BODY MASS INDEX: 24.8 KG/M2 | DIASTOLIC BLOOD PRESSURE: 74 MMHG | OXYGEN SATURATION: 99 % | SYSTOLIC BLOOD PRESSURE: 118 MMHG | HEART RATE: 81 BPM | WEIGHT: 156 LBS

## 2025-03-04 DIAGNOSIS — Z00.00 ROUTINE GENERAL MEDICAL EXAMINATION AT A HEALTH CARE FACILITY: ICD-10-CM

## 2025-03-04 DIAGNOSIS — F41.9 ANXIETY AND DEPRESSION: ICD-10-CM

## 2025-03-04 DIAGNOSIS — F32.A ANXIETY AND DEPRESSION: ICD-10-CM

## 2025-03-04 DIAGNOSIS — M25.511 CHRONIC RIGHT SHOULDER PAIN: Primary | ICD-10-CM

## 2025-03-04 DIAGNOSIS — G89.29 CHRONIC RIGHT SHOULDER PAIN: Primary | ICD-10-CM

## 2025-03-04 DIAGNOSIS — F43.21 ADJUSTMENT REACTION, DEPRESSIVE, BRIEF: ICD-10-CM

## 2025-03-04 PROCEDURE — 99214 OFFICE O/P EST MOD 30 MIN: CPT | Performed by: FAMILY MEDICINE

## 2025-03-04 RX ORDER — ALPRAZOLAM 0.5 MG/1
0.5 TABLET ORAL 3 TIMES DAILY PRN
Qty: 10 TABLET | Refills: 0 | Status: SHIPPED | OUTPATIENT
Start: 2025-03-04 | End: 2025-03-10

## 2025-03-04 NOTE — PATIENT INSTRUCTIONS
Please continue the Fluoxetine 10 mg daily for now     Try to wean off the trazodone 75 mg . Take only as needed for insomnia   Work on good sleep hygiene     Follow up with Dr Tadeo as needed for the left wrist pain as needed     Fasting labs due again in May 2025- orders placed to have done     Mammo on 2/2/24 was good repeat yearly . Order placed last visit     Drink more water, make sure the urine is clear.     Continue the supplements if you feel it is helping with out side effects.     Please get updated dental examinations.    Repeat upper and lower scope in 2026.     Glad the weight is stable, and actually you have gained some weight   Try to get at least 1500 calories per day.    Call if you need any medications.     Order for right shoulder xray and referral to ortho/sports med for possible injection     Follow up in 2 months vis virtual to follow up with mood

## 2025-03-04 NOTE — PROGRESS NOTES
Subjective   Patient is a 55 y.o. female presents for anxiety    #) panic attack - last seen on 2/20/25 via televisit   Feeling super overwhelmed with panic attacks   Had to call of from work on Monday, never had to do this before   Then took an ativan Monday night and it worked after 30 mins   - feeling very unorganized   Trying to meditate  The cold weather is dragging  - has been going dancing on Wednesdays   - acute grief reaction - loss of daughter in Spring 2023  - coping as expected, did start going to a grief group  - grief and trauma yoga on Thursday AM   - sleeping is better with the trazodone prn. Down to 75 mg at bedtime ( was up to 150mg at first)  - she found her passed in her bed one morning and performed 20 mins or more of CPR - 5-6 weeks ago - PTSD   - was on effexor in the past for 25 yrs , celexa made her very tired   - unsure if tried anything     #) right shoulder pain   - started about one year ago and was intermittent  - now more persistent   - never got the imaging  - now working for [employer] with repetitive right shoulder movement   - pain with sleeping on the right side   - will refer to ortho too     #) weight is stable at 150s -- 156 today   - referred to endo, appt to see dr Zee   - both daughters were dx with mitochondrial disorders  - has started Co-Q10, Scetly L - Carnitine and digetive enzyme and seems to be helping, gained 5#   - has not started the riboflavin yet   - has been more active with working more since her daughter passed away she has more free time   - night sweats and hot flashes -- resolved for the most part   - CT brain looked good in 2016.   - EGD and Cscope on 5/19/21- repeat in 5 years     #) Bilateral knee replacement, 2019 (jan and Nov)  - right shoulder pain, occ hurts- stable   - does wear a posture brace sometimes which helps   - pulled her rib a few months ago   - yoga seems to help     #) had IUD- this winter is 5 years--- will need referral for removal-  next year with PA will have it removed.   - up to date on colon and breast screenings   - PAP on 2/21/23- neg x 2     #) 2019 wt was 235--> 155 --> 136 down 101# pounds--> 148 , with starting medical THC and knee replacement -->down to 136--> 132--> 141 --> 155 today     #) left thumb concerns- stable   - also left index finger with arthritis   - saw Dr Tadeo     #) fibromyalgia- pain is better but still having it   - she has medical THC card and it really helps   - neck , shoulder and thoracic spine tension   - bad reaction to GABAPENTIN AND LYRICA   - had both knees replaced in 2019   - left foot surgery for tibial tendon    #) [place] with GERD - stable  - stopped her PPI   - did get some teeth removed and getting partial soon   - EGD and Cscope on 5/19/21- repeat in 5 years   - pt reports improved with THC edibles     #) 7/22/23- walked on a rack and it smacked her in the forehead just above the right  inner eye   - immediate and bruising  - then 2 days later started getting right inner eye bruising and then spread to the inner aspect of the left eye.   - no imaging   - recent eye examination and vision is fine  - still intermittent headaches, 3 days per week, worse with sinuses are stuffy    #) tobacco use- for years.     Review of system are negative unless otherwise stated in the HPI.     Objective     /74   Pulse 81   Wt 70.8 kg (156 lb)   SpO2 99%   BMI 24.80 kg/m²     Physical Examination  GEN: A+O, no acute distress  HEENT: NC/AT, Oropharynx clear, no exudates, TM visualized, Extraoccular muscles intact, no facial droop; no thyromegaly or cervical LAD  RESP: CTAB, no wheezes   CV: RRR, no murmurs  ABD: soft, non-tender, + BS  SKIN: no rashes or bruising, no peripheral edema   NEURO: CN II-XII grossly intact, moves all extremities equally, no tremor   PSYCH: normal affect, appropriate mood     Assessment/Plan     Please continue the Fluoxetine 10 mg daily for now     Try to wean off the  trazodone 75 mg . Take only as needed for insomnia   Work on good sleep hygiene     Follow up with Dr Tadeo as needed for the left wrist pain as needed     Fasting labs due again in May 2025- orders placed to have done     Mammo on 2/2/24 was good repeat yearly . Order placed last visit     Drink more water, make sure the urine is clear.     Continue the supplements if you feel it is helping with out side effects.     Please get updated dental examinations.    Repeat upper and lower scope in 2026.     Glad the weight is stable, and actually you have gained some weight   Try to get at least 1500 calories per day.    Call if you need any medications.     Order for right shoulder xray and referral to ortho/sports med for possible injection     Follow up in 2 months vis virtual to follow up with mood

## 2025-04-13 DIAGNOSIS — F51.02 ADJUSTMENT INSOMNIA: ICD-10-CM

## 2025-04-13 RX ORDER — TRAZODONE HYDROCHLORIDE 50 MG/1
50 TABLET ORAL NIGHTLY PRN
Qty: 90 TABLET | Refills: 3 | Status: SHIPPED | OUTPATIENT
Start: 2025-04-13 | End: 2026-04-13

## 2025-05-04 DIAGNOSIS — Z00.00 ROUTINE GENERAL MEDICAL EXAMINATION AT A HEALTH CARE FACILITY: ICD-10-CM

## 2025-05-15 ENCOUNTER — APPOINTMENT (OUTPATIENT)
Dept: PRIMARY CARE | Facility: CLINIC | Age: 55
End: 2025-05-15
Payer: COMMERCIAL

## 2025-07-15 DIAGNOSIS — K21.9 GERD WITHOUT ESOPHAGITIS: ICD-10-CM

## 2025-07-17 RX ORDER — PANTOPRAZOLE SODIUM 40 MG/1
TABLET, DELAYED RELEASE ORAL
Qty: 90 TABLET | Refills: 3 | Status: SHIPPED | OUTPATIENT
Start: 2025-07-17